# Patient Record
Sex: MALE | Race: WHITE | NOT HISPANIC OR LATINO | Employment: FULL TIME | URBAN - METROPOLITAN AREA
[De-identification: names, ages, dates, MRNs, and addresses within clinical notes are randomized per-mention and may not be internally consistent; named-entity substitution may affect disease eponyms.]

---

## 2017-01-13 ENCOUNTER — OFFICE VISIT (OUTPATIENT)
Dept: MEDICAL GROUP | Facility: MEDICAL CENTER | Age: 32
End: 2017-01-13
Payer: COMMERCIAL

## 2017-01-13 VITALS
TEMPERATURE: 98.1 F | WEIGHT: 210.32 LBS | RESPIRATION RATE: 16 BRPM | SYSTOLIC BLOOD PRESSURE: 120 MMHG | HEIGHT: 72 IN | HEART RATE: 74 BPM | OXYGEN SATURATION: 98 % | BODY MASS INDEX: 28.49 KG/M2 | DIASTOLIC BLOOD PRESSURE: 84 MMHG

## 2017-01-13 DIAGNOSIS — Z00.00 HEALTH MAINTENANCE EXAMINATION: ICD-10-CM

## 2017-01-13 DIAGNOSIS — D17.0 LIPOMA OF NECK: ICD-10-CM

## 2017-01-13 DIAGNOSIS — Z82.49 FAMILY HISTORY OF CARDIOVASCULAR DISEASE: ICD-10-CM

## 2017-01-13 PROCEDURE — 99385 PREV VISIT NEW AGE 18-39: CPT | Performed by: FAMILY MEDICINE

## 2017-01-13 ASSESSMENT — PATIENT HEALTH QUESTIONNAIRE - PHQ9: CLINICAL INTERPRETATION OF PHQ2 SCORE: 0

## 2017-01-13 NOTE — MR AVS SNAPSHOT
"        Ant Moya   2017 2:00 PM   Office Visit   MRN: [id number]    Department:  [place] [address]   Dept Phone:  [phone]    Description:  Male : 1985   Provider:  Yancy Siu M.D.           Reason for Visit     Hyperlipidemia recent labs showed elevated cholesterol    Establish Care new patient, establishing care      Allergies as of 2017     No Known Allergies      You were diagnosed with     Family history of cardiovascular disease   [127361]       Health maintenance examination   [704346]       Lipoma of neck   [767773]         Vital Signs     Blood Pressure Pulse Temperature Respirations Height Weight    120/84 mmHg 74 36.7 °C (98.1 °F) 16 1.829 m (6' 0.01\") 95.4 kg (210 lb 5.1 oz)    Body Mass Index Oxygen Saturation Smoking Status             28.52 kg/m2 98% Never Smoker          Basic Information     Date Of Birth Sex Race Ethnicity Preferred Language    1985 Male White Non- English      Your appointments     2017  2:20 PM   Established Patient with Yancy Siu M.D.   [facility]    [address]  [address]  [place] [id number] [phone]           You will be receiving a confirmation call a few days before your appointment from our automated call confirmation system.              Problem List              ICD-10-CM Priority Class Noted - Resolved    Active medical problems: none [id number]   2013 - Present    Family history of cardiovascular disease Z82.49   2017 - Present    Lipoma of neck D17.0   2017 - Present      Health Maintenance        Date Due Completion Dates    IMM DTaP/Tdap/Td Vaccine (1 - Tdap) 2003 ---            Current Immunizations     Influenza TIV (IM) 2013    Influenza Vaccine Quad Inj (Pf) 2016  6:44 PM, 2014    Influenza Vaccine Quad Inj (Preserved) 10/29/2015    Tuberculin Skin Test 2014  1:40 PM, 2013 " 12:40 PM, 12/11/2013  9:35 AM      Below and/or attached are the medications your provider expects you to take. Review all of your home medications and newly ordered medications with your provider and/or pharmacist. Follow medication instructions as directed by your provider and/or pharmacist. Please keep your medication list with you and share with your provider. Update the information when medications are discontinued, doses are changed, or new medications (including over-the-counter products) are added; and carry medication information at all times in the event of emergency situations     Allergies:  No Known Allergies          Medications  Valid as of: January 13, 2017 -  2:56 PM    Generic Name Brand Name Tablet Size Instructions for use    .                 Medicines prescribed today were sent to:     [facility] [address] [place] [address] [place], [place] - [address] AT [facility]    [address] [place] [address]    Phone: [phone] Fax: [phone]    Open 24 Hours?: No      Medication refill instructions:       If your prescription bottle indicates you have medication refills left, it is not necessary to call your provider’s office. Please contact your pharmacy and they will refill your medication.    If your prescription bottle indicates you do not have any refills left, you may request refills at any time through one of the following ways: The online [employer] system (except Urgent Care), by calling your provider’s office, or by asking your pharmacy to contact your provider’s office with a refill request. Medication refills are processed only during regular business hours and may not be available until the next business day. Your provider may request additional information or to have a follow-up visit with you prior to refilling your medication.   *Please Note: Medication refills are assigned a new Rx number when refilled electronically. Your pharmacy may indicate that no refills were  authorized even though a new prescription for the same medication is available at the pharmacy. Please request the medicine by name with the pharmacy before contacting your provider for a refill.        Your To Do List     Future Labs/Procedures Complete By Expires    CBC WITH DIFFERENTIAL  As directed 1/14/2018    COMP METABOLIC PANEL  As directed 1/14/2018    LIPID PROFILE  As directed 1/14/2018    TSH WITH REFLEX TO FT4  As directed 1/13/2018    US-SOFT TISSUES OF HEAD - NECK  As directed 7/16/2017    VITAMIN D,25 HYDROXY  As directed 1/14/2018      Referral     A referral request has been sent to our patient care coordination department. Please allow 3-5 business days for us to process this request and contact you either by phone or mail. If you do not hear from us by the 5th business day, please call us at [phone].           [employer] Access Code: Activation code not generated  Current [employer] Status: Active

## 2017-01-14 NOTE — PROGRESS NOTES
Ant Moya is a 31 y.o. male here for   Chief Complaint   Patient presents with   • Hyperlipidemia     recent labs showed elevated cholesterol   • Establish Care     new patient, establishing care     HPI: Ant is a very pleasant 31-year-old male. He is here today with his wife actually. 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    1. Health maintenance examination  Flu shot up-to-date  Patient sure that he has received a recent tetanus  Exercise: Minimal  Diet: Increased amount of red meat over the last year    2. Family history of cardiovascular disease  Patient's father, paternal uncle, paternal grandfather all had MIs around age 50. His dad is very healthy and active. He was very proactive about his health care and had a massive heart attack at age 54. His grandfather and uncle had heart attacks in their 40s. He is concerned about his health as he gets older. He is interested in seeing a cardiologist and having a complete cardiac workup. He is not exercising regularly currently.    3. Lipoma of neck  This is chronic and has been there for about 3 years. He has never had this evaluated. He does get headaches and believes that it is related to this. He denies any numbness or tingling down the arms. It is growing.        Current medicines (including changes today)  No current outpatient prescriptions on file.     No current facility-administered medications for this visit.     He  has no past medical history on file.  He  has no past surgical history on file.  Social History   Substance Use Topics   • Smoking status: Never Smoker    • Smokeless tobacco: Never Used   • Alcohol Use: Yes      Comment: rare     Social History     Social History Narrative     Family History   Problem Relation Age of Onset   • No Known Problems Mother    • Heart Disease Father 56     MI   • Arrythmia Father    • Heart Disease Paternal Uncle 56     MI   • Stroke Maternal Grandmother      cerebral aneurysm   •  "Stroke Paternal Grandfather    • Heart Disease Paternal Grandfather 44     MI     Family Status   Relation Status Death Age   • Mother Alive    • Father Alive          ROS  Positive for skin changes on the leg  All other systems reviewed and are negative     Objective:     Blood pressure 120/84, pulse 74, temperature 36.7 °C (98.1 °F), resp. rate 16, height 1.829 m (6' 0.01\"), weight 95.4 kg (210 lb 5.1 oz), SpO2 98 %. Body mass index is 28.52 kg/(m^2).  Physical Exam:    Constitutional: Alert, no distress.  Skin: Warm, dry, good turgor, no rashes in visible areas. 5 x 5 cm mass on the posterior neck. 0.4 x 0.4 mm dark nodule on the right arm, skin color no nodule on the left leg just inferior to the buttock  Eye: Equal, round and reactive, conjunctiva clear, lids normal.  ENMT: Lips without lesions, good dentition, oropharynx clear.  Neck: Trachea midline, no masses, no thyromegaly. No cervical or supraclavicular lymphadenopathy.  Respiratory: Unlabored respiratory effort, lungs clear to auscultation, no wheezes, no ronchi.  Cardiovascular: Normal S1, S2, no murmur, no edema.  Abdomen: Soft, non-tender, no masses, no hepatosplenomegaly.  Psych: Alert and oriented x3, normal affect and mood.      Assessment and Plan:   The following treatment plan was discussed    1. Health maintenance examination  Vaccines up-to-date  Labs ordered  Importance of a regular exercise routine and diet discussed in detail  - CBC WITH DIFFERENTIAL; Future  - COMP METABOLIC PANEL; Future  - VITAMIN D,25 HYDROXY; Future  - TSH WITH REFLEX TO FT4; Future  - LIPID PROFILE; Future    2. Family history of cardiovascular disease  Chronic and stable. History reviewed and he does have a strong family history of early cardiovascular disease  Lipids reviewed, recheck fasting lipid profile and other labs  Importance of daily exercise and a plant-based diet discussed in detail  Discussed possibility of primary prevention with aspirin and statin later " on, he would like to see cardiology for further workup this referral has been placed  - REFERRAL TO CARDIOLOGY    3. Lipoma of neck  This is chronic and growing. I have ordered an ultrasound for further evaluation  - US-SOFT TISSUES OF HEAD - NECK; Future    Records requested.  Followup: Return for skin excision, Long.

## 2017-01-27 ENCOUNTER — HOSPITAL ENCOUNTER (OUTPATIENT)
Dept: RADIOLOGY | Facility: MEDICAL CENTER | Age: 32
End: 2017-01-27
Attending: FAMILY MEDICINE
Payer: COMMERCIAL

## 2017-01-27 ENCOUNTER — HOSPITAL ENCOUNTER (OUTPATIENT)
Dept: LAB | Facility: MEDICAL CENTER | Age: 32
End: 2017-01-27
Attending: FAMILY MEDICINE
Payer: COMMERCIAL

## 2017-01-27 DIAGNOSIS — D17.0 LIPOMA OF NECK: ICD-10-CM

## 2017-01-27 DIAGNOSIS — Z00.00 HEALTH MAINTENANCE EXAMINATION: ICD-10-CM

## 2017-01-27 LAB
25(OH)D3 SERPL-MCNC: 18 NG/ML (ref 30–100)
ALBUMIN SERPL BCP-MCNC: 5 G/DL (ref 3.2–4.9)
ALBUMIN/GLOB SERPL: 1.6 G/DL
ALP SERPL-CCNC: 43 U/L (ref 30–99)
ALT SERPL-CCNC: 24 U/L (ref 2–50)
ANION GAP SERPL CALC-SCNC: 9 MMOL/L (ref 0–11.9)
AST SERPL-CCNC: 18 U/L (ref 12–45)
BASOPHILS # BLD AUTO: 0.06 K/UL (ref 0–0.12)
BASOPHILS NFR BLD AUTO: 1.2 % (ref 0–1.8)
BILIRUB SERPL-MCNC: 1.3 MG/DL (ref 0.1–1.5)
BUN SERPL-MCNC: 15 MG/DL (ref 8–22)
CALCIUM SERPL-MCNC: 10.1 MG/DL (ref 8.5–10.5)
CHLORIDE SERPL-SCNC: 102 MMOL/L (ref 96–112)
CHOLEST SERPL-MCNC: 205 MG/DL (ref 100–199)
CO2 SERPL-SCNC: 26 MMOL/L (ref 20–33)
CREAT SERPL-MCNC: 0.95 MG/DL (ref 0.5–1.4)
EOSINOPHIL # BLD: 0.13 K/UL (ref 0–0.51)
EOSINOPHIL NFR BLD AUTO: 2.5 % (ref 0–6.9)
ERYTHROCYTE [DISTWIDTH] IN BLOOD BY AUTOMATED COUNT: 39.4 FL (ref 35.9–50)
GLOBULIN SER CALC-MCNC: 3.2 G/DL (ref 1.9–3.5)
GLUCOSE SERPL-MCNC: 78 MG/DL (ref 65–99)
HCT VFR BLD AUTO: 47.5 % (ref 42–52)
HDLC SERPL-MCNC: 45 MG/DL
HGB BLD-MCNC: 15.8 G/DL (ref 14–18)
IMM GRANULOCYTES # BLD AUTO: 0.02 K/UL (ref 0–0.11)
IMM GRANULOCYTES NFR BLD AUTO: 0.4 % (ref 0–0.9)
LDLC SERPL CALC-MCNC: 136 MG/DL
LYMPHOCYTES # BLD: 1.45 K/UL (ref 1–4.8)
LYMPHOCYTES NFR BLD AUTO: 27.8 % (ref 22–41)
MCH RBC QN AUTO: 28.3 PG (ref 27–33)
MCHC RBC AUTO-ENTMCNC: 33.3 G/DL (ref 33.7–35.3)
MCV RBC AUTO: 85.1 FL (ref 81.4–97.8)
MONOCYTES # BLD: 0.4 K/UL (ref 0–0.85)
MONOCYTES NFR BLD AUTO: 7.7 % (ref 0–13.4)
NEUTROPHILS # BLD: 3.15 K/UL (ref 1.82–7.42)
NEUTROPHILS NFR BLD AUTO: 60.4 % (ref 44–72)
NRBC # BLD AUTO: 0 K/UL
NRBC BLD-RTO: 0 /100 WBC
PLATELET # BLD AUTO: 372 K/UL (ref 164–446)
PMV BLD AUTO: 9.6 FL (ref 9–12.9)
POTASSIUM SERPL-SCNC: 4 MMOL/L (ref 3.6–5.5)
PROT SERPL-MCNC: 8.2 G/DL (ref 6–8.2)
RBC # BLD AUTO: 5.58 M/UL (ref 4.7–6.1)
SODIUM SERPL-SCNC: 137 MMOL/L (ref 135–145)
TRIGL SERPL-MCNC: 120 MG/DL (ref 0–149)
TSH SERPL DL<=0.005 MIU/L-ACNC: 1.49 UIU/ML (ref 0.3–3.7)
WBC # BLD AUTO: 5.2 K/UL (ref 4.8–10.8)

## 2017-01-27 PROCEDURE — 82306 VITAMIN D 25 HYDROXY: CPT

## 2017-01-27 PROCEDURE — 80061 LIPID PANEL: CPT

## 2017-01-27 PROCEDURE — 76536 US EXAM OF HEAD AND NECK: CPT

## 2017-01-27 PROCEDURE — 84443 ASSAY THYROID STIM HORMONE: CPT

## 2017-01-27 PROCEDURE — 85025 COMPLETE CBC W/AUTO DIFF WBC: CPT

## 2017-01-27 PROCEDURE — 80053 COMPREHEN METABOLIC PANEL: CPT

## 2017-01-27 PROCEDURE — 36415 COLL VENOUS BLD VENIPUNCTURE: CPT

## 2017-02-17 ENCOUNTER — HOSPITAL ENCOUNTER (OUTPATIENT)
Facility: MEDICAL CENTER | Age: 32
End: 2017-02-17
Attending: FAMILY MEDICINE
Payer: COMMERCIAL

## 2017-02-17 ENCOUNTER — OFFICE VISIT (OUTPATIENT)
Dept: MEDICAL GROUP | Facility: MEDICAL CENTER | Age: 32
End: 2017-02-17
Payer: COMMERCIAL

## 2017-02-17 VITALS
SYSTOLIC BLOOD PRESSURE: 110 MMHG | DIASTOLIC BLOOD PRESSURE: 86 MMHG | WEIGHT: 207.89 LBS | OXYGEN SATURATION: 95 % | BODY MASS INDEX: 28.16 KG/M2 | HEIGHT: 72 IN | TEMPERATURE: 98.5 F | RESPIRATION RATE: 16 BRPM | HEART RATE: 75 BPM

## 2017-02-17 DIAGNOSIS — D22.61 MELANOCYTIC NEVUS OF RIGHT UPPER EXTREMITY: ICD-10-CM

## 2017-02-17 DIAGNOSIS — D17.0 LIPOMA OF NECK: ICD-10-CM

## 2017-02-17 PROBLEM — D22.9 ATYPICAL MOLE: Status: ACTIVE | Noted: 2017-02-17

## 2017-02-17 PROBLEM — D22.60 MELANOCYTIC NEVUS OF UPPER EXTREMITY: Status: ACTIVE | Noted: 2017-02-17

## 2017-02-17 LAB
AMBIGUOUS DTTM AMBI4: NORMAL
PATHOLOGY CONSULT NOTE: NORMAL

## 2017-02-17 PROCEDURE — 88305 TISSUE EXAM BY PATHOLOGIST: CPT

## 2017-02-17 PROCEDURE — 99213 OFFICE O/P EST LOW 20 MIN: CPT | Mod: 25 | Performed by: FAMILY MEDICINE

## 2017-02-17 PROCEDURE — 11401 EXC TR-EXT B9+MARG 0.6-1 CM: CPT | Performed by: FAMILY MEDICINE

## 2017-02-17 NOTE — MR AVS SNAPSHOT
Ant Moya   2017 2:20 PM   Office Visit   MRN: [id number]    Department:  [place] [address]   Dept Phone:  [phone]    Description:  Male : 1985   Provider:  Yancy Siu M.D.           Reason for Visit     Nevus removal      Allergies as of 2017     No Known Allergies      You were diagnosed with     Melanocytic nevus of right upper extremity   [0386133]       Lipoma of neck   [500843]         Vital Signs     Blood Pressure Pulse Temperature Respirations Height Weight    110/86 mmHg 75 36.9 °C (98.5 °F) 16 1.829 m (6') 94.3 kg (207 lb 14.3 oz)    Body Mass Index Oxygen Saturation Smoking Status             28.19 kg/m2 95% Never Smoker          Basic Information     Date Of Birth Sex Race Ethnicity Preferred Language    1985 Male White Non- English      Your appointments     Mar 17, 2017 11:15 AM   NEW PATIENT with Gurpreet Hutchison M.D.   [facility] (--)    [address] [facility] [address]  [facility] [address]   [phone]              Problem List              ICD-10-CM Priority Class Noted - Resolved    Family history of cardiovascular disease Z82.49   2017 - Present    Lipoma of neck D17.0   2017 - Present    Melanocytic nevus of upper extremity D22.60   2017 - Present    Hyperlipidemia E78.5   Unknown - Present      Health Maintenance        Date Due Completion Dates    IMM DTaP/Tdap/Td Vaccine (1 - Tdap) 2003 ---            Current Immunizations     Influenza TIV (IM) 2013    Influenza Vaccine Quad Inj (Pf) 2016  6:44 PM, 2014    Influenza Vaccine Quad Inj (Preserved) 10/29/2015    Tuberculin Skin Test 2014  1:40 PM, 2013 12:40 PM, 2013  9:35 AM      Below and/or attached are the medications your provider expects you to take. Review all of your home medications and newly ordered medications with your provider and/or pharmacist. Follow medication instructions  as directed by your provider and/or pharmacist. Please keep your medication list with you and share with your provider. Update the information when medications are discontinued, doses are changed, or new medications (including over-the-counter products) are added; and carry medication information at all times in the event of emergency situations     Allergies:  No Known Allergies          Medications  Valid as of: February 17, 2017 -  4:39 PM    Generic Name Brand Name Tablet Size Instructions for use    .                 Medicines prescribed today were sent to:     [facility] [address] [place] [address], [place] - [address] [facility] [address] [address] [place] [address]    Phone: [phone] Fax: [phone]    Open 24 Hours?: No      Medication refill instructions:       If your prescription bottle indicates you have medication refills left, it is not necessary to call your provider’s office. Please contact your pharmacy and they will refill your medication.    If your prescription bottle indicates you do not have any refills left, you may request refills at any time through one of the following ways: The online [email or web address] system (except Urgent Care), by calling your provider’s office, or by asking your pharmacy to contact your provider’s office with a refill request. Medication refills are processed only during regular business hours and may not be available until the next business day. Your provider may request additional information or to have a follow-up visit with you prior to refilling your medication.   *Please Note: Medication refills are assigned a new Rx number when refilled electronically. Your pharmacy may indicate that no refills were authorized even though a new prescription for the same medication is available at the pharmacy. Please request the medicine by name with the pharmacy before contacting your provider for a refill.        Referral     A referral request has been sent to  our patient care coordination department. Please allow 3-5 business days for us to process this request and contact you either by phone or mail. If you do not hear from us by the 5th business day, please call us at [phone].           [employer] Access Code: Activation code not generated  Current [employer] Status: Active

## 2017-02-17 NOTE — PROGRESS NOTES
Subjective:   Ant Moya is a 31 y.o. male here today for   Chief Complaint   Patient presents with   • Nevus     removal       1. Melanocytic nevus of right upper extremity  This is chronic and has been there for about 15 years. It is very dark in color. It has not changed. It does not itch or bleed. He would like it removed because of the concerning appearance.    2. Lipoma of neck  This is chronic. It has been there for many years. He is concerned because it is growing. He is wondering if this should be removed. He does not have any pain from this currently.    Ultrasound neck  1.  Subcutaneous mass at site of palpable abnormality is identified with characteristics consistent with lipoma. No other abnormalities are identified.    Current medicines (including changes today)  No current outpatient prescriptions on file.     No current facility-administered medications for this visit.     He  has no past medical history on file.    ROS  No neck pain, no numbness or tingling       Objective:     Blood pressure 110/86, pulse 75, temperature 36.9 °C (98.5 °F), resp. rate 16, height 1.829 m (6'), weight 94.3 kg (207 lb 14.3 oz), SpO2 95 %. Body mass index is 28.19 kg/(m^2).   Physical Exam:  Constitutional: Alert, no distress.  Skin: Warm, dry, good turgor, melanocytic 0.6 mm nevus on the right forearm  Eye: conjunctiva clear, lids normal.  ENMT: Lips without lesions, good dentition  Neck: Trachea midline, large 10 cm lipoma at the base of the neck  Respiratory: Unlabored respiratory effort  Cardiovascular: Regular rate  Psych: Alert and oriented x3, normal affect and mood.      Assessment and Plan:   The following treatment plan was discussed    1. Melanocytic nevus of right upper extremity    PROCEDURE NOTE, EXCISIONAL BIOPSY    Indication: Melanocytic nevus    Location of lesion to be excised: radial forearm    History of allergy to iodine: no     The risks (including bleeding and infection) and  benefits of the procedure and written informed consent obtained.     Local anesthesia was performed with  Lidocaine 1% with epinephrine  without added sodium bicarbonate (total 3cc), prepped with ChloraPrep, and draped in the usual sterile fashion.  A scalpel was used to excise an elliptical area of skin approximately 1cm by 1cm.  there was an area of black pigmentation outside of the nevus tract underneath the skin. This area was separately excised to remove all of the pigmentation. The wound was closed with 4-0 Vicryl using simple interrupted stitches and a sterile dressing applied.  The specimen was sent for pathologic examination.     The patient tolerated the procedure well and without apparent complications.     The patient was instructed to keep the wound dry and covered for 24-48h and clean thereafter, and warning signs of infection were reviewed.  Will return for suture removal in 7 days.  Recommended that the patient use OTC acetaminophen, OTC ibuprofen as needed for pain.  Followup sooner for any concerns.      2. Lipoma of neck  Chronic, stable  Because of the size and location of this, and concern for enlargement, will have him consult with a general surgeon  - REFERRAL TO GENERAL SURGERY      Followup: Return in about 7 days (around 2/24/2017), or if symptoms worsen or fail to improve, for suture removal.       This note was created using voice recognition software. I have made every reasonable attempt to correct errors, however, I do anticipate some grammatical errors.

## 2017-03-17 ENCOUNTER — OFFICE VISIT (OUTPATIENT)
Dept: CARDIOLOGY | Facility: MEDICAL CENTER | Age: 32
End: 2017-03-17
Payer: COMMERCIAL

## 2017-03-17 VITALS
SYSTOLIC BLOOD PRESSURE: 130 MMHG | HEIGHT: 72 IN | WEIGHT: 206.5 LBS | OXYGEN SATURATION: 98 % | BODY MASS INDEX: 27.97 KG/M2 | HEART RATE: 81 BPM | DIASTOLIC BLOOD PRESSURE: 88 MMHG

## 2017-03-17 DIAGNOSIS — Z82.49 FAMILY HISTORY OF HEART DISEASE: ICD-10-CM

## 2017-03-17 DIAGNOSIS — R00.2 PALPITATIONS: ICD-10-CM

## 2017-03-17 LAB — EKG IMPRESSION: NORMAL

## 2017-03-17 PROCEDURE — 99204 OFFICE O/P NEW MOD 45 MIN: CPT | Performed by: INTERNAL MEDICINE

## 2017-03-17 PROCEDURE — 93000 ELECTROCARDIOGRAM COMPLETE: CPT | Performed by: INTERNAL MEDICINE

## 2017-03-17 ASSESSMENT — ENCOUNTER SYMPTOMS
DOUBLE VISION: 0
ORTHOPNEA: 0
ABDOMINAL PAIN: 0
BLOOD IN STOOL: 0
SENSORY CHANGE: 0
MYALGIAS: 0
HALLUCINATIONS: 0
SPEECH CHANGE: 0
FEVER: 0
VOMITING: 0
NAUSEA: 0
PALPITATIONS: 0
DEPRESSION: 0
SHORTNESS OF BREATH: 0
LOSS OF CONSCIOUSNESS: 0
FALLS: 0
CHILLS: 0
BRUISES/BLEEDS EASILY: 0
EYE DISCHARGE: 0
PND: 0
CLAUDICATION: 0
EYE PAIN: 0
HEADACHES: 0
BLURRED VISION: 0
WEIGHT LOSS: 0
DIZZINESS: 0
COUGH: 0

## 2017-03-17 NOTE — PROGRESS NOTES
Subjective:   Ant Moya is a 31 y.o. male who presents today for cardiac care and evaluation in the heart clinic because of strong family history of heart disease. He has a lot of family members  of heart attack. His father is one of the few people that made it past 50 years old. Given that, he is quite concerned about his own risk. He does have history of fluctuating cholesterol issue. He is not on any medication for it though. No prior cardiac history or surgery. No invasive procedure either. He exercises every now and then without problems. No chest pain or shortness of breath. Occasional palpitations.    I have reviewed patient's ECG, which shows normal sinus rhythm, normal MD, QT intervals. No evidence of acute coronary syndrome.    Past Medical History   Diagnosis Date   • Hyperlipidemia      History reviewed. No pertinent past surgical history.  Family History   Problem Relation Age of Onset   • No Known Problems Mother    • Heart Disease Father 56     MI   • Arrythmia Father    • Heart Disease Paternal Uncle 56     MI   • Stroke Maternal Grandmother      cerebral aneurysm   • Stroke Paternal Grandfather    • Heart Disease Paternal Grandfather 44     MI     History   Smoking status   • Never Smoker    Smokeless tobacco   • Never Used     No Known Allergies  Outpatient Encounter Prescriptions as of 3/17/2017   Medication Sig Dispense Refill   • MULTIPLE VITAMIN PO Take 1 Tab by mouth every day.     • vitamin D (CHOLECALCIFEROL) 1000 UNIT Tab Take 1,000 Units by mouth 2 Times a Day.       No facility-administered encounter medications on file as of 3/17/2017.     Review of Systems   Constitutional: Negative for fever, chills, weight loss and malaise/fatigue.   HENT: Negative for ear discharge, ear pain, hearing loss and nosebleeds.    Eyes: Negative for blurred vision, double vision, pain and discharge.   Respiratory: Negative for cough and shortness of breath.    Cardiovascular: Negative for  "chest pain, palpitations, orthopnea, claudication, leg swelling and PND.   Gastrointestinal: Negative for nausea, vomiting, abdominal pain, blood in stool and melena.   Genitourinary: Negative for dysuria and hematuria.   Musculoskeletal: Negative for myalgias, joint pain and falls.   Skin: Negative for itching and rash.   Neurological: Negative for dizziness, sensory change, speech change, loss of consciousness and headaches.   Endo/Heme/Allergies: Negative for environmental allergies. Does not bruise/bleed easily.   Psychiatric/Behavioral: Negative for depression, suicidal ideas and hallucinations.        Objective:   /88 mmHg  Pulse 81  Ht 1.829 m (6' 0.01\")  Wt 93.668 kg (206 lb 8 oz)  BMI 28.00 kg/m2  SpO2 98%    Physical Exam   Constitutional: He is oriented to person, place, and time. He appears well-developed and well-nourished.   HENT:   Head: Normocephalic and atraumatic.   Eyes: EOM are normal.   Neck: Normal range of motion. No JVD present.   Cardiovascular: Normal rate, regular rhythm, normal heart sounds and intact distal pulses.  Exam reveals no gallop and no friction rub.    No murmur heard.  Bilateral femoral pulses are 2+, bilateral dorsalis pedis pulses are 2+, bilateral posterior tibialis pulses are 2+.   Pulmonary/Chest: No respiratory distress. He has no wheezes. He has no rales. He exhibits no tenderness.   Abdominal: Soft. Bowel sounds are normal. There is no tenderness. There is no rebound and no guarding.   The is no presence of abdominal bruits   Musculoskeletal: Normal range of motion.   Neurological: He is alert and oriented to person, place, and time.   Skin: Skin is warm and dry.   Psychiatric: He has a normal mood and affect.   Nursing note reviewed.      Assessment:     1. Palpitations  EKG    ECHOCARDIOGRAM COMP W/O CONT    TREADMILL STRESS    COMP METABOLIC PANEL    LIPID PANEL   2. Family history of heart disease  ECHOCARDIOGRAM COMP W/O CONT    TREADMILL STRESS    COMP " METABOLIC PANEL    LIPID PANEL       Medical Decision Making:  Today's Assessment / Status / Plan:     To further risk stratify, I will obtain a transthoracic echocardiogram along with a treadmill stress test.    Blood pressure is well controlled today. No medication.    I will see patient back in clinic with lab tests and studies results in 6 months.    I thank you Dr. Drake for referring patient to our Cardiology Clinic today.

## 2017-03-17 NOTE — Clinical Note
[facility]   [address], [address]  [place] [address]  Phone: [phone]  Fax: [phone]              Ant Moya  1985    Encounter Date: 3/17/2017    Gurpreet Hutchison M.D.          PROGRESS NOTE:  Subjective:   Ant Moya is a 31 y.o. male who presents today for cardiac care and evaluation in the heart clinic because of strong family history of heart disease. He has a lot of family members  of heart attack. His father is one of the few people that made it past 50 years old. Given that, he is quite concerned about his own risk. He does have history of fluctuating cholesterol issue. He is not on any medication for it though. No prior cardiac history or surgery. No invasive procedure either. He exercises every now and then without problems. No chest pain or shortness of breath. Occasional palpitations.    I have reviewed patient's ECG, which shows normal sinus rhythm, normal MN, QT intervals. No evidence of acute coronary syndrome.    Past Medical History   Diagnosis Date   • Hyperlipidemia      History reviewed. No pertinent past surgical history.  Family History   Problem Relation Age of Onset   • No Known Problems Mother    • Heart Disease Father 56     MI   • Arrythmia Father    • Heart Disease Paternal Uncle 56     MI   • Stroke Maternal Grandmother      cerebral aneurysm   • Stroke Paternal Grandfather    • Heart Disease Paternal Grandfather 44     MI     History   Smoking status   • Never Smoker    Smokeless tobacco   • Never Used     No Known Allergies  Outpatient Encounter Prescriptions as of 3/17/2017   Medication Sig Dispense Refill   • MULTIPLE VITAMIN PO Take 1 Tab by mouth every day.     • vitamin D (CHOLECALCIFEROL) 1000 UNIT Tab Take 1,000 Units by mouth 2 Times a Day.       No facility-administered encounter medications on file as of 3/17/2017.     Review of Systems   Constitutional: Negative for fever,  "chills, weight loss and malaise/fatigue.   HENT: Negative for ear discharge, ear pain, hearing loss and nosebleeds.    Eyes: Negative for blurred vision, double vision, pain and discharge.   Respiratory: Negative for cough and shortness of breath.    Cardiovascular: Negative for chest pain, palpitations, orthopnea, claudication, leg swelling and PND.   Gastrointestinal: Negative for nausea, vomiting, abdominal pain, blood in stool and melena.   Genitourinary: Negative for dysuria and hematuria.   Musculoskeletal: Negative for myalgias, joint pain and falls.   Skin: Negative for itching and rash.   Neurological: Negative for dizziness, sensory change, speech change, loss of consciousness and headaches.   Endo/Heme/Allergies: Negative for environmental allergies. Does not bruise/bleed easily.   Psychiatric/Behavioral: Negative for depression, suicidal ideas and hallucinations.        Objective:   /88 mmHg  Pulse 81  Ht 1.829 m (6' 0.01\")  Wt 93.668 kg (206 lb 8 oz)  BMI 28.00 kg/m2  SpO2 98%    Physical Exam   Constitutional: He is oriented to person, place, and time. He appears well-developed and well-nourished.   HENT:   Head: Normocephalic and atraumatic.   Eyes: EOM are normal.   Neck: Normal range of motion. No JVD present.   Cardiovascular: Normal rate, regular rhythm, normal heart sounds and intact distal pulses.  Exam reveals no gallop and no friction rub.    No murmur heard.  Bilateral femoral pulses are 2+, bilateral dorsalis pedis pulses are 2+, bilateral posterior tibialis pulses are 2+.   Pulmonary/Chest: No respiratory distress. He has no wheezes. He has no rales. He exhibits no tenderness.   Abdominal: Soft. Bowel sounds are normal. There is no tenderness. There is no rebound and no guarding.   The is no presence of abdominal bruits   Musculoskeletal: Normal range of motion.   Neurological: He is alert and oriented to person, place, and time.   Skin: Skin is warm and dry.   Psychiatric: He " has a normal mood and affect.   Nursing note reviewed.      Assessment:     1. Palpitations  EKG    ECHOCARDIOGRAM COMP W/O CONT    TREADMILL STRESS    COMP METABOLIC PANEL    LIPID PANEL   2. Family history of heart disease  ECHOCARDIOGRAM COMP W/O CONT    TREADMILL STRESS    COMP METABOLIC PANEL    LIPID PANEL       Medical Decision Making:  Today's Assessment / Status / Plan:     To further risk stratify, I will obtain a transthoracic echocardiogram along with a treadmill stress test.    Blood pressure is well controlled today. No medication.    I will see patient back in clinic with lab tests and studies results in 6 months.    I thank you Dr. Drake for referring patient to our Cardiology Clinic today.        Yancy Siu M.D.  [address] [address]  VIA In Basket

## 2017-04-12 ENCOUNTER — TELEPHONE (OUTPATIENT)
Dept: CARDIOLOGY | Facility: MEDICAL CENTER | Age: 32
End: 2017-04-12

## 2017-04-12 ENCOUNTER — NON-PROVIDER VISIT (OUTPATIENT)
Dept: CARDIOLOGY | Facility: MEDICAL CENTER | Age: 32
End: 2017-04-12
Attending: INTERNAL MEDICINE
Payer: COMMERCIAL

## 2017-04-12 DIAGNOSIS — R00.2 PALPITATIONS: ICD-10-CM

## 2017-04-12 DIAGNOSIS — Z82.49 FAMILY HISTORY OF HEART DISEASE: ICD-10-CM

## 2017-04-12 LAB
LV EJECT FRACT  99904: 55
LV EJECT FRACT MOD 2C 99903: 52.17
LV EJECT FRACT MOD 4C 99902: 60.17
LV EJECT FRACT MOD BP 99901: 55.66
TREADMILL STRESS: NORMAL

## 2017-04-12 PROCEDURE — 93015 CV STRESS TEST SUPVJ I&R: CPT | Performed by: INTERNAL MEDICINE

## 2017-04-12 PROCEDURE — 93306 TTE W/DOPPLER COMPLETE: CPT

## 2017-04-12 PROCEDURE — 93306 TTE W/DOPPLER COMPLETE: CPT | Mod: 26 | Performed by: INTERNAL MEDICINE

## 2017-04-12 NOTE — PROGRESS NOTES
Quick Note:    Dear Gladis,    Can you please let Ant Moya know that result is ok and I will see patient as scheduled?    Thanks Napoleon Griffith.    ______

## 2017-04-12 NOTE — TELEPHONE ENCOUNTER
Pt notified echo & treadmill from today are both WNL.  To follow up as planned in September. (recall)

## 2017-04-12 NOTE — TELEPHONE ENCOUNTER
----- Message from Gurpreet Hutchison M.D. sent at 4/12/2017 11:24 AM PDT -----  Dear Gladis,    Can you please let Ant Moya know that result is ok and I will see patient as scheduled?    Thanks Napoleon Griffith.

## 2017-04-18 ENCOUNTER — TELEPHONE (OUTPATIENT)
Dept: CARDIOLOGY | Facility: MEDICAL CENTER | Age: 32
End: 2017-04-18

## 2017-04-19 NOTE — TELEPHONE ENCOUNTER
TREADMILL STRESS   Status: Edited Result - FINAL     Visible to patient:  Janet     Dx:  Palpitations; Family history of heart...               Notes Recorded by Gurpreet Hutchison M.D. on 4/12/2017 at 3:39 PM  Dear Gladis,    Can you please let Ant Moya know that result is ok and I will see patient as scheduled?    Thanks Napoleon Griffith.    Notes Recorded by Gladis Gates, L.P.NNelson on 4/12/2017 at 10:30 AM  Echo & TM same day.  No FU to Dr. Hutchison     4/17/17:  Pt notified his echo and treadmill are both non-concerning.  To follow up as planned in September with Dr. Carli Gates

## 2017-04-26 RX ORDER — IBUPROFEN 200 MG
400-800 TABLET ORAL EVERY 6 HOURS PRN
COMMUNITY
End: 2019-02-27

## 2017-05-04 ENCOUNTER — HOSPITAL ENCOUNTER (OUTPATIENT)
Facility: MEDICAL CENTER | Age: 32
End: 2017-05-04
Attending: SURGERY | Admitting: SURGERY
Payer: COMMERCIAL

## 2017-05-04 VITALS
RESPIRATION RATE: 15 BRPM | BODY MASS INDEX: 28.34 KG/M2 | WEIGHT: 209.22 LBS | SYSTOLIC BLOOD PRESSURE: 115 MMHG | DIASTOLIC BLOOD PRESSURE: 68 MMHG | HEIGHT: 72 IN | HEART RATE: 71 BPM | OXYGEN SATURATION: 98 % | TEMPERATURE: 98.6 F

## 2017-05-04 PROBLEM — R22.9 SUBCUTANEOUS MASS: Status: ACTIVE | Noted: 2017-05-04

## 2017-05-04 PROBLEM — R22.9 SUBCUTANEOUS MASS: Status: RESOLVED | Noted: 2017-05-04 | Resolved: 2017-05-04

## 2017-05-04 PROCEDURE — 160009 HCHG ANES TIME/MIN: Performed by: SURGERY

## 2017-05-04 PROCEDURE — 160025 RECOVERY II MINUTES (STATS): Performed by: SURGERY

## 2017-05-04 PROCEDURE — 501445 HCHG STAPLER, SKIN DISP: Performed by: SURGERY

## 2017-05-04 PROCEDURE — 502240 HCHG MISC OR SUPPLY RC 0272: Performed by: SURGERY

## 2017-05-04 PROCEDURE — 160002 HCHG RECOVERY MINUTES (STAT): Performed by: SURGERY

## 2017-05-04 PROCEDURE — 160028 HCHG SURGERY MINUTES - 1ST 30 MINS LEVEL 3: Performed by: SURGERY

## 2017-05-04 PROCEDURE — 160047 HCHG PACU  - EA ADDL 30 MINS PHASE II: Performed by: SURGERY

## 2017-05-04 PROCEDURE — 160039 HCHG SURGERY MINUTES - EA ADDL 1 MIN LEVEL 3: Performed by: SURGERY

## 2017-05-04 PROCEDURE — 88304 TISSUE EXAM BY PATHOLOGIST: CPT

## 2017-05-04 PROCEDURE — 700111 HCHG RX REV CODE 636 W/ 250 OVERRIDE (IP)

## 2017-05-04 PROCEDURE — 160048 HCHG OR STATISTICAL LEVEL 1-5: Performed by: SURGERY

## 2017-05-04 PROCEDURE — 160046 HCHG PACU - 1ST 60 MINS PHASE II: Performed by: SURGERY

## 2017-05-04 PROCEDURE — 700101 HCHG RX REV CODE 250

## 2017-05-04 PROCEDURE — 160035 HCHG PACU - 1ST 60 MINS PHASE I: Performed by: SURGERY

## 2017-05-04 PROCEDURE — 501838 HCHG SUTURE GENERAL: Performed by: SURGERY

## 2017-05-04 RX ORDER — SODIUM CHLORIDE, SODIUM LACTATE, POTASSIUM CHLORIDE, CALCIUM CHLORIDE 600; 310; 30; 20 MG/100ML; MG/100ML; MG/100ML; MG/100ML
1000 INJECTION, SOLUTION INTRAVENOUS
Status: COMPLETED | OUTPATIENT
Start: 2017-05-04 | End: 2017-05-04

## 2017-05-04 RX ORDER — KETOROLAC TROMETHAMINE 30 MG/ML
INJECTION, SOLUTION INTRAMUSCULAR; INTRAVENOUS
Status: COMPLETED
Start: 2017-05-04 | End: 2017-05-04

## 2017-05-04 RX ORDER — HYDROCODONE BITARTRATE AND ACETAMINOPHEN 5; 325 MG/1; MG/1
1-2 TABLET ORAL EVERY 4 HOURS PRN
Qty: 20 TAB | Refills: 0 | Status: SHIPPED | OUTPATIENT
Start: 2017-05-04 | End: 2019-02-27

## 2017-05-04 RX ORDER — MIDAZOLAM HYDROCHLORIDE 1 MG/ML
INJECTION INTRAMUSCULAR; INTRAVENOUS
Status: DISCONTINUED
Start: 2017-05-04 | End: 2017-05-04 | Stop reason: HOSPADM

## 2017-05-04 RX ORDER — LIDOCAINE HYDROCHLORIDE 10 MG/ML
INJECTION, SOLUTION INFILTRATION; PERINEURAL
Status: COMPLETED
Start: 2017-05-04 | End: 2017-05-04

## 2017-05-04 RX ORDER — BUPIVACAINE HYDROCHLORIDE AND EPINEPHRINE 2.5; 5 MG/ML; UG/ML
INJECTION, SOLUTION EPIDURAL; INFILTRATION; INTRACAUDAL; PERINEURAL
Status: DISCONTINUED | OUTPATIENT
Start: 2017-05-04 | End: 2017-05-04 | Stop reason: HOSPADM

## 2017-05-04 RX ORDER — ACETAMINOPHEN 325 MG/1
1000 TABLET ORAL EVERY 4 HOURS PRN
COMMUNITY
End: 2019-02-27

## 2017-05-04 RX ADMIN — LIDOCAINE HYDROCHLORIDE: 10 INJECTION, SOLUTION INFILTRATION; PERINEURAL at 07:00

## 2017-05-04 RX ADMIN — SODIUM CHLORIDE, SODIUM LACTATE, POTASSIUM CHLORIDE, CALCIUM CHLORIDE 1000 ML: 600; 310; 30; 20 INJECTION, SOLUTION INTRAVENOUS at 07:00

## 2017-05-04 RX ADMIN — KETOROLAC TROMETHAMINE 30 MG: 30 INJECTION, SOLUTION INTRAMUSCULAR at 08:55

## 2017-05-04 ASSESSMENT — PAIN SCALES - GENERAL
PAINLEVEL_OUTOF10: 0

## 2017-05-04 ASSESSMENT — PAIN SCALES - WONG BAKER
WONGBAKER_NUMERICALRESPONSE: DOESN'T HURT AT ALL
WONGBAKER_NUMERICALRESPONSE: DOESN'T HURT AT ALL

## 2017-05-04 NOTE — IP AVS SNAPSHOT
5/4/2017    Ant Moya  [address] [place] [address]    Dear Ant:    [facility] wants to ensure your discharge home is safe and you or your loved ones have had all of your questions answered regarding your care after you leave the hospital.    Below is a list of resources and contact information should you have any questions regarding your hospital stay, follow-up instructions, or active medical symptoms.    Questions or Concerns Regarding… Contact   Medical Questions Related to Your Discharge  (7 days a week, 8am-5pm) Contact a Nurse Care Coordinator   [phone]   Medical Questions Not Related to Your Discharge  (24 hours a day / 7 days a week)  Contact the Nurse Health Line   [phone]    Medications or Discharge Instructions Refer to your discharge packet   or contact your [facility] Primary Care Provider   [phone]   Follow-up Appointment(s) Schedule your appointment via [employer]   or contact Scheduling [phone]   Billing Review your statement via [employer]  or contact Billing [phone]   Medical Records Review your records via [employer]   or contact Medical Records [phone]     You may receive a telephone call within two days of discharge. This call is to make certain you understand your discharge instructions and have the opportunity to have any questions answered. You can also easily access your medical information, test results and upcoming appointments via the [employer] free online health management tool. You can learn more and sign up at [employer]. For assistance setting up your [employer] account, please call [phone].    Once again, we want to ensure your discharge home is safe and that you have a clear understanding of any next steps in your care. If you have any questions or concerns, please do not hesitate to contact us, we are here for you. Thank you for choosing [facility] for your healthcare needs.    Sincerely,    Your [facility] Healthcare Team

## 2017-05-04 NOTE — PROGRESS NOTES
The Medication Reconciliation process has been completed by interviewing the patient    Allergies have been reviewed  Antibiotic use in 30 days - NONE    Home Pharmacy:  [facility]

## 2017-05-04 NOTE — OP REPORT
Operative Report     Patient:   Ant Moya  :    1985  MRN:    [id number]      Date:    2017    Surgeon:   Cameron Garner M.D.     Assistant:    None    Pre-operative Diagnosis:  5cm soft tissue mass of the posterior neck    Post-operative Diagnosis: same     Procedure:   Excision of soft tissue mass    Anesthesia:   General plus local 0.25% Marcaine with epinephrine    Blood Loss:   Minimal    Specimen:   Soft tissue mass to pathology for permanent    Findings:   5cm soft tissue mass consistent with lipoma    Wound classification: Clean    Disposition:   PACU in stable condition      Procedure in detail:  The patient was identified in the pre-operative holding area and brought to the operating room. Pre-operative antibiotics were administered prior to the procedure. General anesthesia was smoothly induced. The patient was positioned in a left lateral decubitus position and then prepped and draped in the usual sterile fashion. A surgical time out was called to identify the correct patient, procedure, and equipment.  Everyone was in agreement.    After infiltration of local anesthetic, an incision was made over the mass.  The subcutaneous tissue was dissected free from the margins of the mass in all directions using cautery and sharp dissection.  Once the mass was removed, the cavity was copiously irrigated and hemostasis was achieved with cautery.  The deeper tissues were reapproximated with multiple layers of interrupted absorbable sutures.  The skin was reapproximated with a running subcuticular suture.    Skin glue was applied.    Sponge and needle counts were correct at the end of the case.     The patient was awakened from general anesthetic, and was taken to the recovery room in stable condition.     Cameron Garner MD  General and Vascular Surgery  [facility]  Office [phone]

## 2017-05-04 NOTE — IP AVS SNAPSHOT
Home Care Instructions                                                                                                                Name:Ant Moya  Medical Record Number:[id number]  CSN: [id number]    YOB: 1985   Age: 31 y.o.  Sex: male  HT:1.829 m (6') WT: 94.9 kg (209 lb 3.5 oz)          Admit Date: 5/4/2017     Discharge Date:   Today's Date: 5/4/2017  Attending Doctor:  Cameron Garner M.D.                  Allergies:  Review of patient's allergies indicates no known allergies.                Discharge Instructions         ACTIVITY: Rest and take it easy for the first 24 hours.  A responsible adult is recommended to remain with you during that time.  It is normal to feel sleepy.  We encourage you to not do anything that requires balance, judgment or coordination.    MILD FLU-LIKE SYMPTOMS ARE NORMAL. YOU MAY EXPERIENCE GENERALIZED MUSCLE ACHES, THROAT IRRITATION, HEADACHE AND/OR SOME NAUSEA.    FOR 24 HOURS DO NOT:  Drive, operate machinery or run household appliances.  Drink beer or alcoholic beverages.   Make important decisions or sign legal documents.    SPECIAL INSTRUCTIONS:   Discharge Instructions     Procedure: Soft Tissue Mass Excision     1. ACTIVITIES: Upon discharge from the hospital, the day of surgery it is requested that you do no significant physical activity and no driving as you have had sedation. The day after surgery, you may resume activities of daily living, but for three weeks, no strenuous activities or heavy lifting (greater than 15 pounds).     2. DRIVING: You may drive whenever you are off pain medications and are able to perform the activities needed to drive, i.e. turning, bending, twisting, etc.     3. WOUND: It is not unusual for patients to experience swelling and even bruising, as well as a small amount of drainage from the incision. This is normal and will resolve over the next few days.     4. ICE: please use ice on the wound to decrease the  swelling for the first 24 hours and then discontinue. Apply ice for 20 minutes and then remove for 20 minutes, alternating while awake.     5. BATHING: Incision is covered with skin glue.  You do not need to cover it. You can shower starting 1 day after the operation.   Avoid submerging the incision in water (tub, bath, pool) for at least a week.     6. PAIN MEDICATION: You will be given a prescription for pain medication at discharge. Please take these as directed. It is important to remember not to take medications on an empty stomach as this may cause nausea.     7. BOWEL FUNCTION: After surgery, it is not uncommon for patients to experience constipation. This is due to decreasing activity levels as well as pain medications. You may wish to use a stool softener beginning immediately after surgery, and you may or may not need to use a laxative (Milk of Magnesia, Ex-lax; Senokot, etc.) as well.     8 .CALL IF YOU HAVE: (1) Fevers to more than 101.5 F, (2) Unusual or excessive pain, (3) Drainage or fluid from incision that may be foul smelling, increased tenderness or soreness at the wound or the wound edges are no longer together, redness or swelling at the incision site. Please do not hesitate to call with any other questions.     9. APPOINTMENT: Contact our office at [phone] for a follow-up appointment 2 weeks following your procedure.     If you have any additional questions, please do not hesitate to call the office and speak to either myself or the physician on call.     Office addresses:   [address] [place] [address] [address]   [facility] [address]   [phone]     Cameron Garner MD   [facility]   [phone]    DIET: To avoid nausea, slowly advance diet as tolerated, avoiding spicy or greasy foods for the first day.  Add more substantial food to your diet according to your physician's instructions.  INCREASE FLUIDS AND FIBER TO AVOID CONSTIPATION.    FOLLOW-UP APPOINTMENT:  A follow-up appointment should  be arranged with your doctor in 1-2 weeks; call to schedule.    You should CALL YOUR PHYSICIAN if you develop:  Fever greater than 101 degrees F.  Pain not relieved by medication, or persistent nausea or vomiting.  Excessive bleeding (blood soaking through dressing) or unexpected drainage from the wound.  Extreme redness or swelling around the incision site, drainage of pus or foul smelling drainage.  Inability to urinate or empty your bladder within 8 hours.  Problems with breathing or chest pain.    You should call [phone] if you develop problems with breathing or chest pain.  If you are unable to contact your doctor or surgical center, you should go to the nearest emergency room or urgent care center.  Physician's telephone #: [phone]    If any questions arise, call your doctor.  If your doctor is not available, please feel free to call the Surgical Center at [phone].  The Center is open Monday through Friday from 7AM to 7PM.  You can also call the [employer] HOTLINE open 24 hours/day, 7 days/week and speak to a nurse at [phone], or toll free at [phone].    A registered nurse may call you a few days after your surgery to see how you are doing after your procedure.    MEDICATIONS: Resume taking daily medication.  Take prescribed pain medication with food.  If no medication is prescribed, you may take non-aspirin pain medication if needed.  PAIN MEDICATION CAN BE VERY CONSTIPATING.  Take a stool softener or laxative such as senokot, pericolace, or milk of magnesia if needed.    Prescription given for Norco.  No oral pain medication given, you may take a dose at anytime.    If your physician has prescribed pain medication that includes Acetaminophen (Tylenol), do not take additional Acetaminophen (Tylenol) while taking the prescribed medication.    Depression / Suicide Risk    As you are discharged from this [facility] facility, it is important to learn how to keep safe from harming  yourself.    Recognize the warning signs:  · Abrupt changes in personality, positive or negative- including increase in energy   · Giving away possessions  · Change in eating patterns- significant weight changes-  positive or negative  · Change in sleeping patterns- unable to sleep or sleeping all the time   · Unwillingness or inability to communicate  · Depression  · Unusual sadness, discouragement and loneliness  · Talk of wanting to die  · Neglect of personal appearance   · Rebelliousness- reckless behavior  · Withdrawal from people/activities they love  · Confusion- inability to concentrate     If you or a loved one observes any of these behaviors or has concerns about self-harm, here's what you can do:  · Talk about it- your feelings and reasons for harming yourself  · Remove any means that you might use to hurt yourself (examples: pills, rope, extension cords, firearm)  · Get professional help from the community (Mental Health, Substance Abuse, psychological counseling)  · Do not be alone:Call your Safe Contact- someone whom you trust who will be there for you.  · Call your local CRISIS HOTLINE 401-9933 or 761-101-4878  · Call your local Children's Mobile Crisis Response Team [place] (976) 071-5270 or www.[employer]  · Call the toll free National Suicide Prevention Hotlines   · National Suicide Prevention Lifeline 625-993-IKTH (5053)  · National Hope Line Network 800-SUICIDE (462-4680)       Medication List      START taking these medications        Instructions    Morning Afternoon Evening Bedtime    hydrocodone-acetaminophen 5-325 MG Tabs per tablet   Commonly known as:  NORCO        Doctor's comments:  No driving while on pain medications   Take 1-2 Tabs by mouth every four hours as needed (as needed for pain).   Dose:  1-2 Tab                          CONTINUE taking these medications        Instructions    Morning Afternoon Evening Bedtime    acetaminophen 325 MG Tabs   Commonly known as:   TYLENOL        Take 1,000 mg by mouth every four hours as needed.   Dose:  1000 mg                        ibuprofen 200 MG Tabs   Commonly known as:  MOTRIN        Take 400-800 mg by mouth every 6 hours as needed.   Dose:  400-800 mg                        MULTIPLE VITAMIN PO        Take 1 Tab by mouth every day.   Dose:  1 Tab                        vitamin D 1000 UNIT Tabs   Commonly known as:  cholecalciferol        Take 1,000 Units by mouth 2 Times a Day.   Dose:  1000 Units                             Where to Get Your Medications      You can get these medications from any pharmacy     Bring a paper prescription for each of these medications    - hydrocodone-acetaminophen 5-325 MG Tabs per tablet            Medication Information     Above and/or attached are the medications your physician expects you to take upon discharge. Review all of your home medications and newly ordered medications with your doctor and/or pharmacist. Follow medication instructions as directed by your doctor and/or pharmacist. Please keep your medication list with you and share with your physician. Update the information when medications are discontinued, doses are changed, or new medications (including over-the-counter products) are added; and carry medication information at all times in the event of emergency situations.        Resources     Quit Smoking / Tobacco Use:    I understand the use of any tobacco products increases my chance of suffering from future heart disease or stroke and could cause other illnesses which may shorten my life. Quitting the use of tobacco products is the single most important thing I can do to improve my health. For further information on smoking / tobacco cessation call a Toll Free Quit Line at 1-434.378.6101 (*National Cancer [place]) or 1-983.529.6360 (American Lung Association) or you can access the web based program at www.lungusa.org.    [place] Tobacco Users Help Line:  (955) 454-1379       Toll  Free: 3-730-294-2350  Quit Tobacco Program [facility] (142)810-5682    Crisis Hotline:    National Crisis Hotline:  0-368-KMEOTPJ or 1-389.678.6196    [place] Crisis Hotline:    1-456.706.6521 or 173-495-3061    Discharge Survey:   Thank you for choosing [facility]. We hope we did everything we could to make your hospital stay a pleasant one. You may be receiving a survey and we would appreciate your time and participation in answering the questions. Your input is very valuable to us in our efforts to improve our service to our patients and their families.            Signatures     My signature on this form indicates that:    1. I acknowledge receipt and understanding of these Home Care Instruction.  2. My questions regarding this information have been answered to my satisfaction.  3. I have formulated a plan with my discharge nurse to obtain my prescribed medications for home.    __________________________________      __________________________________                   Patient Signature                                 Guardian/Responsible Adult Signature      __________________________________                 __________       ________                       Nurse Signature                                               Date                 Time

## 2017-05-04 NOTE — DISCHARGE INSTRUCTIONS
ACTIVITY: Rest and take it easy for the first 24 hours.  A responsible adult is recommended to remain with you during that time.  It is normal to feel sleepy.  We encourage you to not do anything that requires balance, judgment or coordination.    MILD FLU-LIKE SYMPTOMS ARE NORMAL. YOU MAY EXPERIENCE GENERALIZED MUSCLE ACHES, THROAT IRRITATION, HEADACHE AND/OR SOME NAUSEA.    FOR 24 HOURS DO NOT:  Drive, operate machinery or run household appliances.  Drink beer or alcoholic beverages.   Make important decisions or sign legal documents.    SPECIAL INSTRUCTIONS:   Discharge Instructions     Procedure: Soft Tissue Mass Excision     1. ACTIVITIES: Upon discharge from the hospital, the day of surgery it is requested that you do no significant physical activity and no driving as you have had sedation. The day after surgery, you may resume activities of daily living, but for three weeks, no strenuous activities or heavy lifting (greater than 15 pounds).     2. DRIVING: You may drive whenever you are off pain medications and are able to perform the activities needed to drive, i.e. turning, bending, twisting, etc.     3. WOUND: It is not unusual for patients to experience swelling and even bruising, as well as a small amount of drainage from the incision. This is normal and will resolve over the next few days.     4. ICE: please use ice on the wound to decrease the swelling for the first 24 hours and then discontinue. Apply ice for 20 minutes and then remove for 20 minutes, alternating while awake.     5. BATHING: Incision is covered with skin glue.  You do not need to cover it. You can shower starting 1 day after the operation.   Avoid submerging the incision in water (tub, bath, pool) for at least a week.     6. PAIN MEDICATION: You will be given a prescription for pain medication at discharge. Please take these as directed. It is important to remember not to take medications on an empty stomach as this may cause  nausea.     7. BOWEL FUNCTION: After surgery, it is not uncommon for patients to experience constipation. This is due to decreasing activity levels as well as pain medications. You may wish to use a stool softener beginning immediately after surgery, and you may or may not need to use a laxative (Milk of Magnesia, Ex-lax; Senokot, etc.) as well.     8 .CALL IF YOU HAVE: (1) Fevers to more than 101.5 F, (2) Unusual or excessive pain, (3) Drainage or fluid from incision that may be foul smelling, increased tenderness or soreness at the wound or the wound edges are no longer together, redness or swelling at the incision site. Please do not hesitate to call with any other questions.     9. APPOINTMENT: Contact our office at [phone] for a follow-up appointment 2 weeks following your procedure.     If you have any additional questions, please do not hesitate to call the office and speak to either myself or the physician on call.     Office addresses:   [address] [place] [id number]   [phone]     Cameron Garner MD   [facility]   [phone]    DIET: To avoid nausea, slowly advance diet as tolerated, avoiding spicy or greasy foods for the first day.  Add more substantial food to your diet according to your physician's instructions.  INCREASE FLUIDS AND FIBER TO AVOID CONSTIPATION.    FOLLOW-UP APPOINTMENT:  A follow-up appointment should be arranged with your doctor in 1-2 weeks; call to schedule.    You should CALL YOUR PHYSICIAN if you develop:  Fever greater than 101 degrees F.  Pain not relieved by medication, or persistent nausea or vomiting.  Excessive bleeding (blood soaking through dressing) or unexpected drainage from the wound.  Extreme redness or swelling around the incision site, drainage of pus or foul smelling drainage.  Inability to urinate or empty your bladder within 8 hours.  Problems with breathing or chest pain.    You should call [phone] if you develop problems with  breathing or chest pain.  If you are unable to contact your doctor or surgical center, you should go to the nearest emergency room or urgent care center.  Physician's telephone #: [phone]    If any questions arise, call your doctor.  If your doctor is not available, please feel free to call the Surgical Center at [phone].  The Center is open Monday through Friday from 7AM to 7PM.  You can also call the HEALTH HOTLINE open 24 hours/day, 7 days/week and speak to a nurse at [phone], or toll free at [phone].    A registered nurse may call you a few days after your surgery to see how you are doing after your procedure.    MEDICATIONS: Resume taking daily medication.  Take prescribed pain medication with food.  If no medication is prescribed, you may take non-aspirin pain medication if needed.  PAIN MEDICATION CAN BE VERY CONSTIPATING.  Take a stool softener or laxative such as senokot, pericolace, or milk of magnesia if needed.    Prescription given for Norco.  No oral pain medication given, you may take a dose at anytime.    If your physician has prescribed pain medication that includes Acetaminophen (Tylenol), do not take additional Acetaminophen (Tylenol) while taking the prescribed medication.    Depression / Suicide Risk    As you are discharged from this [facility] facility, it is important to learn how to keep safe from harming yourself.    Recognize the warning signs:  · Abrupt changes in personality, positive or negative- including increase in energy   · Giving away possessions  · Change in eating patterns- significant weight changes-  positive or negative  · Change in sleeping patterns- unable to sleep or sleeping all the time   · Unwillingness or inability to communicate  · Depression  · Unusual sadness, discouragement and loneliness  · Talk of wanting to die  · Neglect of personal appearance   · Rebelliousness- reckless behavior  · Withdrawal from people/activities they  love  · Confusion- inability to concentrate     If you or a loved one observes any of these behaviors or has concerns about self-harm, here's what you can do:  · Talk about it- your feelings and reasons for harming yourself  · Remove any means that you might use to hurt yourself (examples: pills, rope, extension cords, firearm)  · Get professional help from the community (Mental Health, Substance Abuse, psychological counseling)  · Do not be alone:Call your Safe Contact- someone whom you trust who will be there for you.  · Call your local CRISIS HOTLINE 660-9342 or 963-203-2707  · Call your local Children's Mobile Crisis Response Team [place] (871) 678-6128 or www.[employer]  · Call the toll free National Suicide Prevention Hotlines   · National Suicide Prevention Lifeline 319-896-JZSU (9916)  · National Hope Line Network 800-SUICIDE (212-6628)

## 2017-07-26 ENCOUNTER — HOSPITAL ENCOUNTER (OUTPATIENT)
Dept: LAB | Facility: MEDICAL CENTER | Age: 32
End: 2017-07-26
Payer: COMMERCIAL

## 2017-07-26 LAB
BDY FAT % MEASURED: 20.6 %
BP DIAS: 61 MMHG
BP SYS: 111 MMHG
CHOLEST SERPL-MCNC: 195 MG/DL (ref 100–199)
DIABETES HTDIA: NO
EVENT NAME HTEVT: NORMAL
FASTING HTFAS: YES
GLUCOSE SERPL-MCNC: 96 MG/DL (ref 65–99)
HDLC SERPL-MCNC: 42 MG/DL
HIP CIRCUMFERENCE HTHC: NORMAL IN
HYPERTENSION HTHYP: NO
LDLC SERPL CALC-MCNC: 117 MG/DL
SCREENING LOC CITY HTCIT: NORMAL
SCREENING LOC STATE HTSTA: NORMAL
SCREENING LOCATION HTLOC: NORMAL
SMOKING HTSMO: NO
SUBSCRIBER ID HTSID: NORMAL
TRIGL SERPL-MCNC: 180 MG/DL (ref 0–149)
WAIST CIRCUMFERENCE HTWC: NORMAL IN

## 2017-07-26 PROCEDURE — 36415 COLL VENOUS BLD VENIPUNCTURE: CPT

## 2017-07-26 PROCEDURE — 80061 LIPID PANEL: CPT

## 2017-07-26 PROCEDURE — 82947 ASSAY GLUCOSE BLOOD QUANT: CPT

## 2017-07-26 PROCEDURE — S5190 WELLNESS ASSESSMENT BY NONPH: HCPCS

## 2017-08-31 ENCOUNTER — TELEPHONE (OUTPATIENT)
Dept: MEDICAL GROUP | Facility: LAB | Age: 32
End: 2017-08-31

## 2017-08-31 DIAGNOSIS — Z82.49 FAMILY HISTORY OF CARDIOVASCULAR DISEASE: ICD-10-CM

## 2017-08-31 NOTE — TELEPHONE ENCOUNTER
Claudia from [facility] called stating patient's referral has  in July and will need a new referral to see Dr Hutchison. His Cardiology appointment on 2017.

## 2017-09-05 ENCOUNTER — EH NON-PROVIDER (OUTPATIENT)
Dept: OCCUPATIONAL MEDICINE | Facility: CLINIC | Age: 32
End: 2017-09-05

## 2017-09-05 DIAGNOSIS — Z29.89 NEED FOR ISOLATION: ICD-10-CM

## 2017-09-05 PROCEDURE — 94375 RESPIRATORY FLOW VOLUME LOOP: CPT

## 2017-09-19 ENCOUNTER — TELEPHONE (OUTPATIENT)
Dept: CARDIOLOGY | Facility: MEDICAL CENTER | Age: 32
End: 2017-09-19

## 2017-09-19 NOTE — TELEPHONE ENCOUNTER
Patient wants new lab order put into EPIC   Received: Today   Message Contents   Sylvie Dupont R.N.             TT/Marii     Patient lost his lab order and wants a new order put into EPIC.      Returned patient call. Informed new order not needed. Reminded of fasting status prior to lab draw.

## 2017-09-20 ENCOUNTER — HOSPITAL ENCOUNTER (OUTPATIENT)
Dept: LAB | Facility: MEDICAL CENTER | Age: 32
End: 2017-09-20
Attending: INTERNAL MEDICINE
Payer: COMMERCIAL

## 2017-09-20 DIAGNOSIS — R00.2 PALPITATIONS: ICD-10-CM

## 2017-09-20 DIAGNOSIS — Z82.49 FAMILY HISTORY OF HEART DISEASE: ICD-10-CM

## 2017-09-20 LAB
ALBUMIN SERPL BCP-MCNC: 4.5 G/DL (ref 3.2–4.9)
ALBUMIN/GLOB SERPL: 1.4 G/DL
ALP SERPL-CCNC: 54 U/L (ref 30–99)
ALT SERPL-CCNC: 29 U/L (ref 2–50)
ANION GAP SERPL CALC-SCNC: 9 MMOL/L (ref 0–11.9)
AST SERPL-CCNC: 20 U/L (ref 12–45)
BILIRUB SERPL-MCNC: 0.5 MG/DL (ref 0.1–1.5)
BUN SERPL-MCNC: 15 MG/DL (ref 8–22)
CALCIUM SERPL-MCNC: 9.6 MG/DL (ref 8.5–10.5)
CHLORIDE SERPL-SCNC: 103 MMOL/L (ref 96–112)
CHOLEST SERPL-MCNC: 215 MG/DL (ref 100–199)
CO2 SERPL-SCNC: 24 MMOL/L (ref 20–33)
CREAT SERPL-MCNC: 0.92 MG/DL (ref 0.5–1.4)
GFR SERPL CREATININE-BSD FRML MDRD: >60 ML/MIN/1.73 M 2
GLOBULIN SER CALC-MCNC: 3.2 G/DL (ref 1.9–3.5)
GLUCOSE SERPL-MCNC: 89 MG/DL (ref 65–99)
HDLC SERPL-MCNC: 45 MG/DL
LDLC SERPL CALC-MCNC: 143 MG/DL
POTASSIUM SERPL-SCNC: 4.2 MMOL/L (ref 3.6–5.5)
PROT SERPL-MCNC: 7.7 G/DL (ref 6–8.2)
SODIUM SERPL-SCNC: 136 MMOL/L (ref 135–145)
TRIGL SERPL-MCNC: 133 MG/DL (ref 0–149)

## 2017-09-20 PROCEDURE — 80061 LIPID PANEL: CPT

## 2017-09-20 PROCEDURE — 36415 COLL VENOUS BLD VENIPUNCTURE: CPT

## 2017-09-20 PROCEDURE — 80053 COMPREHEN METABOLIC PANEL: CPT

## 2017-09-21 ENCOUNTER — OFFICE VISIT (OUTPATIENT)
Dept: CARDIOLOGY | Facility: MEDICAL CENTER | Age: 32
End: 2017-09-21
Payer: COMMERCIAL

## 2017-09-21 VITALS
WEIGHT: 207 LBS | HEIGHT: 72 IN | OXYGEN SATURATION: 97 % | HEART RATE: 68 BPM | BODY MASS INDEX: 28.04 KG/M2 | SYSTOLIC BLOOD PRESSURE: 110 MMHG | DIASTOLIC BLOOD PRESSURE: 80 MMHG

## 2017-09-21 DIAGNOSIS — E78.5 OTHER AND UNSPECIFIED HYPERLIPIDEMIA: ICD-10-CM

## 2017-09-21 DIAGNOSIS — Z82.49 FAMILY HISTORY OF CARDIOVASCULAR DISEASE: ICD-10-CM

## 2017-09-21 PROCEDURE — 99214 OFFICE O/P EST MOD 30 MIN: CPT | Performed by: INTERNAL MEDICINE

## 2017-09-21 ASSESSMENT — ENCOUNTER SYMPTOMS
WEIGHT LOSS: 0
MYALGIAS: 0
EYE PAIN: 0
DIZZINESS: 0
CHILLS: 0
SENSORY CHANGE: 0
VOMITING: 0
BLURRED VISION: 0
CLAUDICATION: 0
PALPITATIONS: 0
PND: 0
COUGH: 0
SPEECH CHANGE: 0
DEPRESSION: 0
HEADACHES: 0
BRUISES/BLEEDS EASILY: 0
SHORTNESS OF BREATH: 0
HALLUCINATIONS: 0
ABDOMINAL PAIN: 0
FEVER: 0
LOSS OF CONSCIOUSNESS: 0
FALLS: 0
EYE DISCHARGE: 0
ORTHOPNEA: 0
NAUSEA: 0
BLOOD IN STOOL: 0
DOUBLE VISION: 0

## 2017-09-21 NOTE — PROGRESS NOTES
Subjective:   Ant Moya is a 32 y.o. male who presents today for cardiac care and evaluation in the heart clinic because of strong family history of heart disease. He has a lot of family members  of heart attack. His father is one of the few people that made it past 50 years old.     His cardiac workup with a transthoracic echocardiogram and treadmill stress test came back non-worrisome.     In the interim, patient has been doing well without having any symptoms. Patient denies having chest pain, dyspnea, palpitation, presyncope, syncope episodes. Able to climb up at least 2 flights of stairs.      Past Medical History:   Diagnosis Date   • Hyperlipidemia     diet controlled     Past Surgical History:   Procedure Laterality Date   • MASS EXCISION GENERAL  2017    Procedure: MASS EXCISION GENERAL SUBCUATNEOUS POSTERIOR NECK;  Surgeon: Cameron Garner M.D.;  Location: SURGERY [facility];  Service:      Family History   Problem Relation Age of Onset   • No Known Problems Mother    • Heart Disease Father 56     MI   • Arrythmia Father    • Heart Disease Paternal Uncle 56     MI   • Stroke Maternal Grandmother      cerebral aneurysm   • Stroke Paternal Grandfather    • Heart Disease Paternal Grandfather 44     MI     History   Smoking Status   • Former Smoker   • Years: 1.00   • Types: Cigars, Pipe   Smokeless Tobacco   • Never Used     No Known Allergies  Outpatient Encounter Prescriptions as of 2017   Medication Sig Dispense Refill   • MULTIPLE VITAMIN PO Take 1 Tab by mouth every day.     • vitamin D (CHOLECALCIFEROL) 1000 UNIT Tab Take 1,000 Units by mouth 2 Times a Day.     • acetaminophen (TYLENOL) 325 MG Tab Take 1,000 mg by mouth every four hours as needed.     • hydrocodone-acetaminophen (NORCO) 5-325 MG Tab per tablet Take 1-2 Tabs by mouth every four hours as needed (as needed for pain). 20 Tab 0   • ibuprofen (MOTRIN) 200 MG Tab Take 400-800 mg by mouth every 6 hours as  needed.       No facility-administered encounter medications on file as of 9/21/2017.      Review of Systems   Constitutional: Negative for chills, fever, malaise/fatigue and weight loss.   HENT: Negative for ear discharge, ear pain, hearing loss and nosebleeds.    Eyes: Negative for blurred vision, double vision, pain and discharge.   Respiratory: Negative for cough and shortness of breath.    Cardiovascular: Negative for chest pain, palpitations, orthopnea, claudication, leg swelling and PND.   Gastrointestinal: Negative for abdominal pain, blood in stool, melena, nausea and vomiting.   Genitourinary: Negative for dysuria and hematuria.   Musculoskeletal: Negative for falls, joint pain and myalgias.   Skin: Negative for itching and rash.   Neurological: Negative for dizziness, sensory change, speech change, loss of consciousness and headaches.   Endo/Heme/Allergies: Negative for environmental allergies. Does not bruise/bleed easily.   Psychiatric/Behavioral: Negative for depression, hallucinations and suicidal ideas.        Objective:   /80   Pulse 68   Ht 1.829 m (6')   Wt 93.9 kg (207 lb)   SpO2 97%   BMI 28.07 kg/m²     Physical Exam   Constitutional: He is oriented to person, place, and time. He appears well-developed and well-nourished.   HENT:   Head: Normocephalic and atraumatic.   Eyes: EOM are normal.   Neck: Normal range of motion. No JVD present.   Cardiovascular: Normal rate, regular rhythm, normal heart sounds and intact distal pulses.  Exam reveals no gallop and no friction rub.    No murmur heard.  Bilateral femoral pulses are 2+, bilateral dorsalis pedis pulses are 2+, bilateral posterior tibialis pulses are 2+.   Pulmonary/Chest: No respiratory distress. He has no wheezes. He has no rales. He exhibits no tenderness.   Abdominal: Soft. Bowel sounds are normal. There is no tenderness. There is no rebound and no guarding.   The is no presence of abdominal bruits   Musculoskeletal: Normal  range of motion.   Neurological: He is alert and oriented to person, place, and time.   Skin: Skin is warm and dry.   Psychiatric: He has a normal mood and affect.   Nursing note and vitals reviewed.      Assessment:     1. Other and unspecified hyperlipidemia  COMP METABOLIC PANEL    LIPID PANEL   2. Family history of cardiovascular disease  COMP METABOLIC PANEL    LIPID PANEL       Medical Decision Making:  Today's Assessment / Status / Plan:   At this time patient is clinically stable in terms of his cardiac standpoint.  Blood pressure is well controlled.    I will see patient back in clinic with lab tests and studies results in 12 months.    I thank you Dr. Siu for referring patient to our Cardiology Clinic today.

## 2017-09-21 NOTE — LETTER
[facility]   [address], [facility] [address] [facility] [address]  [place] [address]  Phone: [phone]  Fax: [phone]              Ant Moya  1985    Encounter Date: 2017    Gurpreet Hutchison M.D.          PROGRESS NOTE:  Subjective:   Ant Moya is a 32 y.o. male who presents today for cardiac care and evaluation in the heart clinic because of strong family history of heart disease. He has a lot of family members  of heart attack. His father is one of the few people that made it past 50 years old.     His cardiac workup with a transthoracic echocardiogram and treadmill stress test came back non-worrisome.     In the interim, patient has been doing well without having any symptoms. Patient denies having chest pain, dyspnea, palpitation, presyncope, syncope episodes. Able to climb up at least 2 flights of stairs.      Past Medical History:   Diagnosis Date   • Hyperlipidemia     diet controlled     Past Surgical History:   Procedure Laterality Date   • MASS EXCISION GENERAL  2017    Procedure: MASS EXCISION GENERAL SUBCUATNEOUS POSTERIOR NECK;  Surgeon: Cameron Garner M.D.;  Location: SURGERY [facility];  Service:      Family History   Problem Relation Age of Onset   • No Known Problems Mother    • Heart Disease Father 56     MI   • Arrythmia Father    • Heart Disease Paternal Uncle 56     MI   • Stroke Maternal Grandmother      cerebral aneurysm   • Stroke Paternal Grandfather    • Heart Disease Paternal Grandfather 44     MI     History   Smoking Status   • Former Smoker   • Years: 1.00   • Types: Cigars, Pipe   Smokeless Tobacco   • Never Used     No Known Allergies  Outpatient Encounter Prescriptions as of 2017   Medication Sig Dispense Refill   • MULTIPLE VITAMIN PO Take 1 Tab by mouth every day.     • vitamin D (CHOLECALCIFEROL) 1000 UNIT Tab Take 1,000 Units by mouth 2 Times a Day.     • acetaminophen (TYLENOL) 325 MG Tab  Take 1,000 mg by mouth every four hours as needed.     • hydrocodone-acetaminophen (NORCO) 5-325 MG Tab per tablet Take 1-2 Tabs by mouth every four hours as needed (as needed for pain). 20 Tab 0   • ibuprofen (MOTRIN) 200 MG Tab Take 400-800 mg by mouth every 6 hours as needed.       No facility-administered encounter medications on file as of 9/21/2017.      Review of Systems   Constitutional: Negative for chills, fever, malaise/fatigue and weight loss.   HENT: Negative for ear discharge, ear pain, hearing loss and nosebleeds.    Eyes: Negative for blurred vision, double vision, pain and discharge.   Respiratory: Negative for cough and shortness of breath.    Cardiovascular: Negative for chest pain, palpitations, orthopnea, claudication, leg swelling and PND.   Gastrointestinal: Negative for abdominal pain, blood in stool, melena, nausea and vomiting.   Genitourinary: Negative for dysuria and hematuria.   Musculoskeletal: Negative for falls, joint pain and myalgias.   Skin: Negative for itching and rash.   Neurological: Negative for dizziness, sensory change, speech change, loss of consciousness and headaches.   Endo/Heme/Allergies: Negative for environmental allergies. Does not bruise/bleed easily.   Psychiatric/Behavioral: Negative for depression, hallucinations and suicidal ideas.        Objective:   /80   Pulse 68   Ht 1.829 m (6')   Wt 93.9 kg (207 lb)   SpO2 97%   BMI 28.07 kg/m²      Physical Exam   Constitutional: He is oriented to person, place, and time. He appears well-developed and well-nourished.   HENT:   Head: Normocephalic and atraumatic.   Eyes: EOM are normal.   Neck: Normal range of motion. No JVD present.   Cardiovascular: Normal rate, regular rhythm, normal heart sounds and intact distal pulses.  Exam reveals no gallop and no friction rub.    No murmur heard.  Bilateral femoral pulses are 2+, bilateral dorsalis pedis pulses are 2+, bilateral posterior tibialis pulses are 2+.      Pulmonary/Chest: No respiratory distress. He has no wheezes. He has no rales. He exhibits no tenderness.   Abdominal: Soft. Bowel sounds are normal. There is no tenderness. There is no rebound and no guarding.   The is no presence of abdominal bruits   Musculoskeletal: Normal range of motion.   Neurological: He is alert and oriented to person, place, and time.   Skin: Skin is warm and dry.   Psychiatric: He has a normal mood and affect.   Nursing note and vitals reviewed.      Assessment:     1. Other and unspecified hyperlipidemia  COMP METABOLIC PANEL    LIPID PANEL   2. Family history of cardiovascular disease  COMP METABOLIC PANEL    LIPID PANEL       Medical Decision Making:  Today's Assessment / Status / Plan:   At this time patient is clinically stable in terms of his cardiac standpoint.  Blood pressure is well controlled.    I will see patient back in clinic with lab tests and studies results in 12 months.    I thank you Dr. Siu for referring patient to our Cardiology Clinic today.        Yancy Siu M.D.  [address] [address]  VIA In Basket

## 2017-10-17 ENCOUNTER — IMMUNIZATION (OUTPATIENT)
Dept: OCCUPATIONAL MEDICINE | Facility: CLINIC | Age: 32
End: 2017-10-17

## 2017-10-17 DIAGNOSIS — Z23 NEED FOR VACCINATION: ICD-10-CM

## 2017-10-17 PROCEDURE — 90686 IIV4 VACC NO PRSV 0.5 ML IM: CPT | Performed by: PREVENTIVE MEDICINE

## 2018-09-19 ENCOUNTER — DOCUMENTATION (OUTPATIENT)
Dept: OCCUPATIONAL MEDICINE | Facility: CLINIC | Age: 33
End: 2018-09-19

## 2018-09-25 ENCOUNTER — EH NON-PROVIDER (OUTPATIENT)
Dept: OCCUPATIONAL MEDICINE | Facility: CLINIC | Age: 33
End: 2018-09-25

## 2018-09-25 DIAGNOSIS — Z02.89 ENCOUNTER FOR OCCUPATIONAL HEALTH EXAMINATION INVOLVING RESPIRATOR: Primary | ICD-10-CM

## 2018-09-25 PROCEDURE — 94375 RESPIRATORY FLOW VOLUME LOOP: CPT | Performed by: PREVENTIVE MEDICINE

## 2018-09-28 ENCOUNTER — HOSPITAL ENCOUNTER (OUTPATIENT)
Dept: LAB | Facility: MEDICAL CENTER | Age: 33
End: 2018-09-28
Payer: COMMERCIAL

## 2018-09-28 LAB
BDY FAT % MEASURED: 23.5 %
BP DIAS: 69 MMHG
BP SYS: 102 MMHG
DIABETES HTDIA: NO
EVENT NAME HTEVT: NORMAL
HYPERTENSION HTHYP: NO
SCREENING LOC CITY HTCIT: NORMAL
SCREENING LOC STATE HTSTA: NORMAL
SCREENING LOCATION HTLOC: NORMAL
SMOKING HTSMO: NO
SUBSCRIBER ID HTSID: NORMAL

## 2018-09-28 PROCEDURE — 80061 LIPID PANEL: CPT

## 2018-09-28 PROCEDURE — S5190 WELLNESS ASSESSMENT BY NONPH: HCPCS

## 2018-09-28 PROCEDURE — 82947 ASSAY GLUCOSE BLOOD QUANT: CPT

## 2018-09-28 PROCEDURE — 36415 COLL VENOUS BLD VENIPUNCTURE: CPT

## 2018-09-29 LAB
CHOLEST SERPL-MCNC: 188 MG/DL (ref 100–199)
FASTING HTFAS: NO
GLUCOSE SERPL-MCNC: 94 MG/DL (ref 65–99)
HDLC SERPL-MCNC: 39 MG/DL
LDLC SERPL CALC-MCNC: 125 MG/DL
TRIGL SERPL-MCNC: 122 MG/DL (ref 0–149)

## 2019-01-24 ENCOUNTER — OFFICE VISIT (OUTPATIENT)
Dept: CARDIOLOGY | Facility: MEDICAL CENTER | Age: 34
End: 2019-01-24
Payer: COMMERCIAL

## 2019-01-24 VITALS
WEIGHT: 201 LBS | DIASTOLIC BLOOD PRESSURE: 78 MMHG | SYSTOLIC BLOOD PRESSURE: 110 MMHG | BODY MASS INDEX: 27.22 KG/M2 | OXYGEN SATURATION: 99 % | HEIGHT: 72 IN | HEART RATE: 70 BPM

## 2019-01-24 DIAGNOSIS — G47.19 EXCESSIVE DAYTIME SLEEPINESS: ICD-10-CM

## 2019-01-24 DIAGNOSIS — Z82.49 FAMILY HISTORY OF CARDIOVASCULAR DISEASE: ICD-10-CM

## 2019-01-24 DIAGNOSIS — R06.09 DYSPNEA ON EXERTION: ICD-10-CM

## 2019-01-24 DIAGNOSIS — E78.2 MIXED HYPERLIPIDEMIA: ICD-10-CM

## 2019-01-24 PROCEDURE — 99214 OFFICE O/P EST MOD 30 MIN: CPT | Performed by: INTERNAL MEDICINE

## 2019-01-24 RX ORDER — ROSUVASTATIN CALCIUM 5 MG/1
5 TABLET, COATED ORAL EVERY EVENING
Qty: 30 TAB | Refills: 11 | Status: SHIPPED | OUTPATIENT
Start: 2019-01-24 | End: 2019-04-26 | Stop reason: SDUPTHER

## 2019-01-24 ASSESSMENT — ENCOUNTER SYMPTOMS
DOUBLE VISION: 0
COUGH: 0
BLURRED VISION: 0
HEADACHES: 0
FEVER: 0
SHORTNESS OF BREATH: 1
PALPITATIONS: 0
SENSORY CHANGE: 0
BRUISES/BLEEDS EASILY: 0
DEPRESSION: 0
ABDOMINAL PAIN: 0
FALLS: 0
DIAPHORESIS: 0
MYALGIAS: 0
MEMORY LOSS: 0
DIZZINESS: 0

## 2019-01-24 NOTE — LETTER
[facility]   [address], [place] [address]  [place] [address]  Phone: [phone]  Fax: [phone]              Ant Moya  1985    Encounter Date: 1/24/2019    Gurpreet Hutchison M.D.          PROGRESS NOTE:  Chief Complaint   Patient presents with   • Palpitations     F/V: 1 YR       Subjective:   Ant Moya is a 33 y.o. male who presents today for cardiac care and management due to strong family risk factor of heart disease along with genetic testing positive for familial hyperlipidemia.  His actual LDL is elevated based on age-adjusted risk analysis.  Patient is asymptomatic however.  No chest pain or shortness of breath.    Past Medical History:   Diagnosis Date   • Hyperlipidemia     diet controlled     Past Surgical History:   Procedure Laterality Date   • MASS EXCISION GENERAL  5/4/2017    Procedure: MASS EXCISION GENERAL SUBCUATNEOUS POSTERIOR NECK;  Surgeon: Cameron Garner M.D.;  Location: SURGERY [facility];  Service:      Family History   Problem Relation Age of Onset   • No Known Problems Mother    • Heart Disease Father 56        MI   • Arrythmia Father    • Heart Disease Paternal Uncle 56        MI   • Stroke Maternal Grandmother         cerebral aneurysm   • Stroke Paternal Grandfather    • Heart Disease Paternal Grandfather 44        MI     Social History     Social History   • Marital status:      Spouse name: N/A   • Number of children: N/A   • Years of education: N/A     Occupational History   • Not on file.     Social History Main Topics   • Smoking status: Former Smoker     Years: 1.00     Types: Cigars, Pipe   • Smokeless tobacco: Never Used   • Alcohol use Yes      Comment: 2 per month                  1-2 cigars month   • Drug use: No   • Sexual activity: Yes     Partners: Female     Other Topics Concern   • Not on file     Social History Narrative   • No narrative on file     No Known  Allergies  Outpatient Encounter Prescriptions as of 1/24/2019   Medication Sig Dispense Refill   • Omega-3 Fatty Acids (FISH OIL PO) Take  by mouth every day.     • rosuvastatin (CRESTOR) 5 MG Tab Take 1 Tab by mouth every evening. 30 Tab 11   • MULTIPLE VITAMIN PO Take 1 Tab by mouth every day.     • vitamin D (CHOLECALCIFEROL) 1000 UNIT Tab Take 1,000 Units by mouth 2 Times a Day.     • acetaminophen (TYLENOL) 325 MG Tab Take 1,000 mg by mouth every four hours as needed.     • hydrocodone-acetaminophen (NORCO) 5-325 MG Tab per tablet Take 1-2 Tabs by mouth every four hours as needed (as needed for pain). (Patient not taking: Reported on 1/24/2019) 20 Tab 0   • ibuprofen (MOTRIN) 200 MG Tab Take 400-800 mg by mouth every 6 hours as needed.       No facility-administered encounter medications on file as of 1/24/2019.      Review of Systems   Constitutional: Negative for diaphoresis and fever.   HENT: Negative for nosebleeds.    Eyes: Negative for blurred vision and double vision.   Respiratory: Positive for shortness of breath. Negative for cough.    Cardiovascular: Negative for chest pain and palpitations.   Gastrointestinal: Negative for abdominal pain.   Genitourinary: Negative for dysuria and frequency.   Musculoskeletal: Negative for falls and myalgias.   Skin: Negative for rash.   Neurological: Negative for dizziness, sensory change and headaches.   Endo/Heme/Allergies: Does not bruise/bleed easily.   Psychiatric/Behavioral: Negative for depression and memory loss.        Objective:   /78 (BP Location: Right arm, Patient Position: Sitting, BP Cuff Size: Adult)   Pulse 70   Ht 1.829 m (6')   Wt 91.2 kg (201 lb)   SpO2 99%   BMI 27.26 kg/m²      Physical Exam   Constitutional: He is oriented to person, place, and time. No distress.   HENT:   Head: Normocephalic and atraumatic.   Right Ear: External ear normal.   Left Ear: External ear normal.   Eyes: Right eye exhibits no discharge. Left eye exhibits  no discharge.   Neck: No JVD present. No thyromegaly present.   Cardiovascular: Normal rate, regular rhythm, normal heart sounds and intact distal pulses.  Exam reveals no gallop and no friction rub.    No murmur heard.  Pulmonary/Chest: Breath sounds normal. No respiratory distress.   Abdominal: Bowel sounds are normal. He exhibits no distension. There is no tenderness.   Musculoskeletal: He exhibits no edema or tenderness.   Neurological: He is alert and oriented to person, place, and time. No cranial nerve deficit.   Skin: Skin is warm and dry. He is not diaphoretic.   Psychiatric: He has a normal mood and affect. His behavior is normal.   Nursing note and vitals reviewed.      Assessment:     1. Mixed hyperlipidemia  rosuvastatin (CRESTOR) 5 MG Tab    COMP METABOLIC PANEL    LIPID PANEL   2. Family history of cardiovascular disease     3. Excessive daytime sleepiness  REFERRAL TO SLEEP STUDIES   4. Dyspnea on exertion  REFERRAL TO SLEEP STUDIES       Medical Decision Making:  Today's Assessment / Status / Plan:   Will start Rosuvastatin 5 mg po daily for primary prevention of cardiovascular disease.  Patient is at high risk based on family history and genetic testing.  I will refer patient to have sleep studies for obstructive sleep apnea.        Yancy Siu M.D.  [address] [address]  VIA In Basket

## 2019-01-24 NOTE — PROGRESS NOTES
Chief Complaint   Patient presents with   • Palpitations     F/V: 1 YR       Subjective:   Ant Moya is a 33 y.o. male who presents today for cardiac care and management due to strong family risk factor of heart disease along with genetic testing positive for familial hyperlipidemia.  His actual LDL is elevated based on age-adjusted risk analysis.  Patient is asymptomatic however.  No chest pain. He does feel very tired and fatigued duringday.     Past Medical History:   Diagnosis Date   • Hyperlipidemia     diet controlled     Past Surgical History:   Procedure Laterality Date   • MASS EXCISION GENERAL  5/4/2017    Procedure: MASS EXCISION GENERAL SUBCUATNEOUS POSTERIOR NECK;  Surgeon: Cameron Garner M.D.;  Location: SURGERY [facility];  Service:      Family History   Problem Relation Age of Onset   • No Known Problems Mother    • Heart Disease Father 56        MI   • Arrythmia Father    • Heart Disease Paternal Uncle 56        MI   • Stroke Maternal Grandmother         cerebral aneurysm   • Stroke Paternal Grandfather    • Heart Disease Paternal Grandfather 44        MI     Social History     Social History   • Marital status:      Spouse name: N/A   • Number of children: N/A   • Years of education: N/A     Occupational History   • Not on file.     Social History Main Topics   • Smoking status: Former Smoker     Years: 1.00     Types: Cigars, Pipe   • Smokeless tobacco: Never Used   • Alcohol use Yes      Comment: 2 per month                  1-2 cigars month   • Drug use: No   • Sexual activity: Yes     Partners: Female     Other Topics Concern   • Not on file     Social History Narrative   • No narrative on file     No Known Allergies  Outpatient Encounter Prescriptions as of 1/24/2019   Medication Sig Dispense Refill   • Omega-3 Fatty Acids (FISH OIL PO) Take  by mouth every day.     • rosuvastatin (CRESTOR) 5 MG Tab Take 1 Tab by mouth every evening. 30 Tab 11   • MULTIPLE  VITAMIN PO Take 1 Tab by mouth every day.     • vitamin D (CHOLECALCIFEROL) 1000 UNIT Tab Take 1,000 Units by mouth 2 Times a Day.     • acetaminophen (TYLENOL) 325 MG Tab Take 1,000 mg by mouth every four hours as needed.     • hydrocodone-acetaminophen (NORCO) 5-325 MG Tab per tablet Take 1-2 Tabs by mouth every four hours as needed (as needed for pain). (Patient not taking: Reported on 1/24/2019) 20 Tab 0   • ibuprofen (MOTRIN) 200 MG Tab Take 400-800 mg by mouth every 6 hours as needed.       No facility-administered encounter medications on file as of 1/24/2019.      Review of Systems   Constitutional: Positive for malaise/fatigue. Negative for diaphoresis and fever.   HENT: Negative for nosebleeds.    Eyes: Negative for blurred vision and double vision.   Respiratory: Positive for shortness of breath. Negative for cough.    Cardiovascular: Negative for chest pain and palpitations.   Gastrointestinal: Negative for abdominal pain.   Genitourinary: Negative for dysuria and frequency.   Musculoskeletal: Negative for falls and myalgias.   Skin: Negative for rash.   Neurological: Negative for dizziness, sensory change and headaches.   Endo/Heme/Allergies: Does not bruise/bleed easily.   Psychiatric/Behavioral: Negative for depression and memory loss.        Objective:   /78 (BP Location: Right arm, Patient Position: Sitting, BP Cuff Size: Adult)   Pulse 70   Ht 1.829 m (6')   Wt 91.2 kg (201 lb)   SpO2 99%   BMI 27.26 kg/m²     Physical Exam   Constitutional: He is oriented to person, place, and time. No distress.   HENT:   Head: Normocephalic and atraumatic.   Right Ear: External ear normal.   Left Ear: External ear normal.   Eyes: Right eye exhibits no discharge. Left eye exhibits no discharge.   Neck: No JVD present. No thyromegaly present.   Cardiovascular: Normal rate, regular rhythm, normal heart sounds and intact distal pulses.  Exam reveals no gallop and no friction rub.    No murmur  heard.  Pulmonary/Chest: Breath sounds normal. No respiratory distress.   Abdominal: Bowel sounds are normal. He exhibits no distension. There is no tenderness.   Musculoskeletal: He exhibits no edema or tenderness.   Neurological: He is alert and oriented to person, place, and time. No cranial nerve deficit.   Skin: Skin is warm and dry. He is not diaphoretic.   Psychiatric: He has a normal mood and affect. His behavior is normal.   Nursing note and vitals reviewed.      Assessment:     1. Mixed hyperlipidemia  rosuvastatin (CRESTOR) 5 MG Tab    COMP METABOLIC PANEL    LIPID PANEL   2. Family history of cardiovascular disease     3. Excessive daytime sleepiness  REFERRAL TO SLEEP STUDIES   4. Dyspnea on exertion  REFERRAL TO SLEEP STUDIES       Medical Decision Making:  Today's Assessment / Status / Plan:   Will start Rosuvastatin 5 mg po daily for primary prevention of cardiovascular disease.  Patient is at high risk based on family history and genetic testing.  I will refer patient to have sleep studies for obstructive sleep apnea.

## 2019-02-27 ENCOUNTER — OFFICE VISIT (OUTPATIENT)
Dept: MEDICAL GROUP | Facility: LAB | Age: 34
End: 2019-02-27
Payer: COMMERCIAL

## 2019-02-27 VITALS
HEIGHT: 72 IN | HEART RATE: 68 BPM | RESPIRATION RATE: 12 BRPM | TEMPERATURE: 97.3 F | BODY MASS INDEX: 26.82 KG/M2 | DIASTOLIC BLOOD PRESSURE: 62 MMHG | SYSTOLIC BLOOD PRESSURE: 104 MMHG | WEIGHT: 198 LBS | OXYGEN SATURATION: 96 %

## 2019-02-27 DIAGNOSIS — D17.0 LIPOMA OF NECK: ICD-10-CM

## 2019-02-27 DIAGNOSIS — Z00.00 HEALTH MAINTENANCE EXAMINATION: ICD-10-CM

## 2019-02-27 DIAGNOSIS — E78.49 FAMILIAL HYPERLIPIDEMIA: ICD-10-CM

## 2019-02-27 DIAGNOSIS — L05.91 PILONIDAL CYST: ICD-10-CM

## 2019-02-27 PROBLEM — D22.60 MELANOCYTIC NEVUS OF UPPER EXTREMITY: Status: RESOLVED | Noted: 2017-02-17 | Resolved: 2019-02-27

## 2019-02-27 PROCEDURE — 99214 OFFICE O/P EST MOD 30 MIN: CPT | Mod: 25 | Performed by: FAMILY MEDICINE

## 2019-02-27 PROCEDURE — 99395 PREV VISIT EST AGE 18-39: CPT | Performed by: FAMILY MEDICINE

## 2019-02-27 ASSESSMENT — PATIENT HEALTH QUESTIONNAIRE - PHQ9: CLINICAL INTERPRETATION OF PHQ2 SCORE: 0

## 2019-02-28 NOTE — PROGRESS NOTES
Subjective:   Ant Moya is a 33 y.o. male here today for anal lesion, annual     1. Health maintenance examination  Patient is here today for annual.  Last checkup over one year ago.  He has been seeing cardiology for diagnosis of familial hyperlipidemia.  He was started on rosuvastatin.  He is trying to eat a healthy diet and exercise.  Vaccinations are up-to-date.    2. Pilonidal cyst  This is new to discuss.  Patient reports a 1-1/2-year history of a nonhealing lesion above the anal verge.  He states that this initially happened while lifting heavy things.  This started out small but then opened up.  It did bleed some.  It looked black on the inside.  He does eventually nearly closed but generally does not completely closed.  No pus that he notices.  No significant pain.  No fevers or chills.  No anal leakage.  He does have some constipation and a hemorrhoid.    3. Familial hyperlipidemia  New to discuss with me.  Patient did genetic testing and was found to have the marker for familial hyperlipidemia.  He does have elevated cholesterol.  He was recently started on statin by his cardiologist.  He is tolerating this well without side effects.      Current medicines (including changes today)  Current Outpatient Prescriptions   Medication Sig Dispense Refill   • Omega-3 Fatty Acids (FISH OIL PO) Take  by mouth every day.     • rosuvastatin (CRESTOR) 5 MG Tab Take 1 Tab by mouth every evening. 30 Tab 11   • MULTIPLE VITAMIN PO Take 1 Tab by mouth every day.     • vitamin D (CHOLECALCIFEROL) 1000 UNIT Tab Take 1,000 Units by mouth 2 Times a Day.       No current facility-administered medications for this visit.      He  has a past medical history of Hyperlipidemia.    ROS   No fevers  No bowel changes  No LE edema       Objective:     Blood pressure 104/62, pulse 68, temperature 36.3 °C (97.3 °F), temperature source Temporal, resp. rate 12, height 1.829 m (6'), weight 89.8 kg (198 lb), SpO2 96 %. Body  mass index is 26.85 kg/m².   Physical Exam:  Constitutional: Alert, no distress.  Skin: Warm, dry, good turgor, there is a 2 cm soft mobile likely lipoma on the posterior neck  Eye: Equal, round and reactive, conjunctiva clear, lids normal.  ENMT: Lips without lesions, good dentition, oropharynx clear.  Neck: Trachea midline, no masses, no thyromegaly. No cervical or supraclavicular lymphadenopathy  Respiratory: Unlabored respiratory effort, lungs clear to auscultation, no wheezes, no ronchi.  Cardiovascular: Normal S1, S2, RRR, no murmur, no edema.  Abdomen: Soft, non-tender, no masses, no hepatosplenomegaly.  Psych: Alert and oriented x3, normal affect and mood.  /Rectal: There is a healed 3 cm white scar at the anal verge extending cephalically.  No tract identified.  There is a small hemorrhoid.  Normal rectal exam otherwise.      Assessment and Plan:   The following treatment plan was discussed    1. Health maintenance examination  Age-appropriate counseling given, vaccinations up-to-date, labs reviewed, medications reviewed    2. Pilonidal cyst  This is a new problem.  Suspect pilonidal cyst although not currently present on exam.  At this point he is not having significant flares, no abscesses.  We did gas expectant management versus surgical evaluation.  I am going to refer to general surgery for further evaluation.  Instructed to keep the area dry and clean  - REFERRAL TO GENERAL SURGERY    3. Familial hyperlipidemia  New to discuss with me, now stable on Crestor and following with cardiology      Followup: Return in about 1 year (around 2/27/2020) for Annual.       This note was created using voice recognition software. I have made every reasonable attempt to correct errors, however, I do anticipate some grammatical errors.

## 2019-04-22 ENCOUNTER — TELEPHONE (OUTPATIENT)
Dept: CARDIOLOGY | Facility: MEDICAL CENTER | Age: 34
End: 2019-04-22

## 2019-04-22 NOTE — TELEPHONE ENCOUNTER
S/w patient's wife about fasting labs, she stated that he will be completing them on Wednesday.

## 2019-04-24 ENCOUNTER — HOSPITAL ENCOUNTER (OUTPATIENT)
Dept: LAB | Facility: MEDICAL CENTER | Age: 34
End: 2019-04-24
Attending: INTERNAL MEDICINE
Payer: COMMERCIAL

## 2019-04-24 DIAGNOSIS — E78.2 MIXED HYPERLIPIDEMIA: ICD-10-CM

## 2019-04-24 LAB
ALBUMIN SERPL BCP-MCNC: 4.6 G/DL (ref 3.2–4.9)
ALBUMIN/GLOB SERPL: 1.5 G/DL
ALP SERPL-CCNC: 50 U/L (ref 30–99)
ALT SERPL-CCNC: 39 U/L (ref 2–50)
ANION GAP SERPL CALC-SCNC: 7 MMOL/L (ref 0–11.9)
AST SERPL-CCNC: 25 U/L (ref 12–45)
BILIRUB SERPL-MCNC: 1 MG/DL (ref 0.1–1.5)
BUN SERPL-MCNC: 14 MG/DL (ref 8–22)
CALCIUM SERPL-MCNC: 9.9 MG/DL (ref 8.5–10.5)
CHLORIDE SERPL-SCNC: 104 MMOL/L (ref 96–112)
CHOLEST SERPL-MCNC: 139 MG/DL (ref 100–199)
CO2 SERPL-SCNC: 26 MMOL/L (ref 20–33)
CREAT SERPL-MCNC: 0.91 MG/DL (ref 0.5–1.4)
GLOBULIN SER CALC-MCNC: 3 G/DL (ref 1.9–3.5)
GLUCOSE SERPL-MCNC: 89 MG/DL (ref 65–99)
HDLC SERPL-MCNC: 48 MG/DL
LDLC SERPL CALC-MCNC: 72 MG/DL
POTASSIUM SERPL-SCNC: 4.1 MMOL/L (ref 3.6–5.5)
PROT SERPL-MCNC: 7.6 G/DL (ref 6–8.2)
SODIUM SERPL-SCNC: 137 MMOL/L (ref 135–145)
TRIGL SERPL-MCNC: 97 MG/DL (ref 0–149)

## 2019-04-24 PROCEDURE — 80053 COMPREHEN METABOLIC PANEL: CPT

## 2019-04-24 PROCEDURE — 36415 COLL VENOUS BLD VENIPUNCTURE: CPT

## 2019-04-24 PROCEDURE — 80061 LIPID PANEL: CPT

## 2019-04-26 ENCOUNTER — OFFICE VISIT (OUTPATIENT)
Dept: CARDIOLOGY | Facility: MEDICAL CENTER | Age: 34
End: 2019-04-26
Payer: COMMERCIAL

## 2019-04-26 VITALS
SYSTOLIC BLOOD PRESSURE: 126 MMHG | HEIGHT: 72 IN | DIASTOLIC BLOOD PRESSURE: 70 MMHG | WEIGHT: 201 LBS | HEART RATE: 70 BPM | BODY MASS INDEX: 27.22 KG/M2

## 2019-04-26 DIAGNOSIS — E78.2 MIXED HYPERLIPIDEMIA: ICD-10-CM

## 2019-04-26 DIAGNOSIS — Z82.49 FAMILY HISTORY OF CARDIOVASCULAR DISEASE: ICD-10-CM

## 2019-04-26 PROCEDURE — 99214 OFFICE O/P EST MOD 30 MIN: CPT | Performed by: INTERNAL MEDICINE

## 2019-04-26 RX ORDER — ROSUVASTATIN CALCIUM 5 MG/1
5 TABLET, COATED ORAL EVERY EVENING
Qty: 90 TAB | Refills: 3 | Status: SHIPPED | OUTPATIENT
Start: 2019-04-26 | End: 2020-05-06 | Stop reason: SDUPTHER

## 2019-04-26 ASSESSMENT — ENCOUNTER SYMPTOMS
SPEECH CHANGE: 0
LOSS OF CONSCIOUSNESS: 0
NAUSEA: 0
EYE DISCHARGE: 0
CLAUDICATION: 0
SHORTNESS OF BREATH: 0
BLURRED VISION: 0
DIZZINESS: 0
PND: 0
COUGH: 0
BRUISES/BLEEDS EASILY: 0
BLOOD IN STOOL: 0
FALLS: 0
CHILLS: 0
HALLUCINATIONS: 0
HEADACHES: 0
ORTHOPNEA: 0
VOMITING: 0
MYALGIAS: 0
FEVER: 0
ABDOMINAL PAIN: 0
EYE PAIN: 0
DEPRESSION: 0
PALPITATIONS: 0
SENSORY CHANGE: 0
WEIGHT LOSS: 0
DOUBLE VISION: 0

## 2019-04-26 NOTE — PROGRESS NOTES
Chief Complaint   Patient presents with   • Hyperlipidemia       Subjective:   Ant Moya is a 33 y.o. male who presents today for cardiac care and management due to strong family risk factor of heart disease along with genetic testing positive for familial hyperlipidemia.  His actual LDL is elevated based on age-adjusted risk analysis.  Patient is asymptomatic however.  No chest pain. He does feel very tired and fatigued duringday.     I have independently interpreted and reviewed blood tests results with patient in clinic which shows normal LDL level 72, triglycerides, renal and liver function.      Past Medical History:   Diagnosis Date   • Hyperlipidemia     diet controlled     Past Surgical History:   Procedure Laterality Date   • MASS EXCISION GENERAL  5/4/2017    Procedure: MASS EXCISION GENERAL SUBCUATNEOUS POSTERIOR NECK;  Surgeon: Cameron Garner M.D.;  Location: SURGERY [facility];  Service:      Family History   Problem Relation Age of Onset   • No Known Problems Mother    • Heart Disease Father 56        MI   • Arrythmia Father    • Heart Disease Paternal Uncle 56        MI   • Stroke Maternal Grandmother         cerebral aneurysm   • Stroke Paternal Grandfather    • Heart Disease Paternal Grandfather 44        MI     Social History     Social History   • Marital status:      Spouse name: N/A   • Number of children: N/A   • Years of education: N/A     Occupational History   • Not on file.     Social History Main Topics   • Smoking status: Former Smoker     Years: 1.00     Types: Cigars, Pipe   • Smokeless tobacco: Never Used   • Alcohol use Yes      Comment: 2 per month                  1-2 cigars month   • Drug use: No   • Sexual activity: Yes     Partners: Female     Other Topics Concern   • Not on file     Social History Narrative   • No narrative on file     No Known Allergies  Outpatient Encounter Prescriptions as of 4/26/2019   Medication Sig Dispense Refill   •  rosuvastatin (CRESTOR) 5 MG Tab Take 1 Tab by mouth every evening. 90 Tab 3   • Omega-3 Fatty Acids (FISH OIL PO) Take  by mouth every day.     • MULTIPLE VITAMIN PO Take 1 Tab by mouth every day.     • vitamin D (CHOLECALCIFEROL) 1000 UNIT Tab Take 1,000 Units by mouth 2 Times a Day.     • [DISCONTINUED] rosuvastatin (CRESTOR) 5 MG Tab Take 1 Tab by mouth every evening. 30 Tab 11     No facility-administered encounter medications on file as of 4/26/2019.      Review of Systems   Constitutional: Negative for chills, fever, malaise/fatigue and weight loss.   HENT: Negative for ear discharge, ear pain, hearing loss and nosebleeds.    Eyes: Negative for blurred vision, double vision, pain and discharge.   Respiratory: Negative for cough and shortness of breath.    Cardiovascular: Negative for chest pain, palpitations, orthopnea, claudication, leg swelling and PND.   Gastrointestinal: Negative for abdominal pain, blood in stool, melena, nausea and vomiting.   Genitourinary: Negative for dysuria and hematuria.   Musculoskeletal: Negative for falls, joint pain and myalgias.   Skin: Negative for itching and rash.   Neurological: Negative for dizziness, sensory change, speech change, loss of consciousness and headaches.   Endo/Heme/Allergies: Negative for environmental allergies. Does not bruise/bleed easily.   Psychiatric/Behavioral: Negative for depression, hallucinations and suicidal ideas.        Objective:   /70 (BP Location: Left arm, Patient Position: Sitting, BP Cuff Size: Adult)   Pulse 70   Ht 1.829 m (6')   Wt 91.2 kg (201 lb)   BMI 27.26 kg/m²     Physical Exam   Constitutional: He is oriented to person, place, and time. No distress.   HENT:   Head: Normocephalic and atraumatic.   Right Ear: External ear normal.   Left Ear: External ear normal.   Eyes: Right eye exhibits no discharge. Left eye exhibits no discharge.   Neck: No JVD present. No thyromegaly present.   Cardiovascular: Normal rate, regular  rhythm, normal heart sounds and intact distal pulses.  Exam reveals no gallop and no friction rub.    No murmur heard.  Pulmonary/Chest: Breath sounds normal. No respiratory distress.   Abdominal: Bowel sounds are normal. He exhibits no distension. There is no tenderness.   Musculoskeletal: He exhibits no edema or tenderness.   Neurological: He is alert and oriented to person, place, and time. No cranial nerve deficit.   Skin: Skin is warm and dry. He is not diaphoretic.   Psychiatric: He has a normal mood and affect. His behavior is normal.   Nursing note and vitals reviewed.      Assessment:     1. Mixed hyperlipidemia  rosuvastatin (CRESTOR) 5 MG Tab    LIPID PANEL   2. Family history of cardiovascular disease  LIPID PANEL       Medical Decision Making:  Today's Assessment / Status / Plan:   At this time patient is clinically stable in terms of his cardiac standpoint.  Cont current medications at current dose.   Rosuvastatin 5 mg po daily.

## 2019-04-26 NOTE — LETTER
[facility]   [address], [place] [address]  [place] [address]  Phone: [phone]  Fax: [phone]              Ant Moya  1985    Encounter Date: 4/26/2019    Gurpreet Hutchison M.D.          PROGRESS NOTE:  Chief Complaint   Patient presents with   • Hyperlipidemia       Subjective:   Ant Moya is a 33 y.o. male who presents today for cardiac care and management due to strong family risk factor of heart disease along with genetic testing positive for familial hyperlipidemia.  His actual LDL is elevated based on age-adjusted risk analysis.  Patient is asymptomatic however.  No chest pain. He does feel very tired and fatigued duringday.     I have independently interpreted and reviewed blood tests results with patient in clinic which shows normal LDL level 72, triglycerides, renal and liver function.      Past Medical History:   Diagnosis Date   • Hyperlipidemia     diet controlled     Past Surgical History:   Procedure Laterality Date   • MASS EXCISION GENERAL  5/4/2017    Procedure: MASS EXCISION GENERAL SUBCUATNEOUS POSTERIOR NECK;  Surgeon: Cameron Garner M.D.;  Location: SURGERY [facility];  Service:      Family History   Problem Relation Age of Onset   • No Known Problems Mother    • Heart Disease Father 56        MI   • Arrythmia Father    • Heart Disease Paternal Uncle 56        MI   • Stroke Maternal Grandmother         cerebral aneurysm   • Stroke Paternal Grandfather    • Heart Disease Paternal Grandfather 44        MI     Social History     Social History   • Marital status:      Spouse name: N/A   • Number of children: N/A   • Years of education: N/A     Occupational History   • Not on file.     Social History Main Topics   • Smoking status: Former Smoker     Years: 1.00     Types: Cigars, Pipe   • Smokeless tobacco: Never Used   • Alcohol use Yes      Comment: 2 per month                  1-2 cigars month      • Drug use: No   • Sexual activity: Yes     Partners: Female     Other Topics Concern   • Not on file     Social History Narrative   • No narrative on file     No Known Allergies  Outpatient Encounter Prescriptions as of 4/26/2019   Medication Sig Dispense Refill   • rosuvastatin (CRESTOR) 5 MG Tab Take 1 Tab by mouth every evening. 90 Tab 3   • Omega-3 Fatty Acids (FISH OIL PO) Take  by mouth every day.     • MULTIPLE VITAMIN PO Take 1 Tab by mouth every day.     • vitamin D (CHOLECALCIFEROL) 1000 UNIT Tab Take 1,000 Units by mouth 2 Times a Day.     • [DISCONTINUED] rosuvastatin (CRESTOR) 5 MG Tab Take 1 Tab by mouth every evening. 30 Tab 11     No facility-administered encounter medications on file as of 4/26/2019.      Review of Systems   Constitutional: Negative for chills, fever, malaise/fatigue and weight loss.   HENT: Negative for ear discharge, ear pain, hearing loss and nosebleeds.    Eyes: Negative for blurred vision, double vision, pain and discharge.   Respiratory: Negative for cough and shortness of breath.    Cardiovascular: Negative for chest pain, palpitations, orthopnea, claudication, leg swelling and PND.   Gastrointestinal: Negative for abdominal pain, blood in stool, melena, nausea and vomiting.   Genitourinary: Negative for dysuria and hematuria.   Musculoskeletal: Negative for falls, joint pain and myalgias.   Skin: Negative for itching and rash.   Neurological: Negative for dizziness, sensory change, speech change, loss of consciousness and headaches.   Endo/Heme/Allergies: Negative for environmental allergies. Does not bruise/bleed easily.   Psychiatric/Behavioral: Negative for depression, hallucinations and suicidal ideas.        Objective:   /70 (BP Location: Left arm, Patient Position: Sitting, BP Cuff Size: Adult)   Pulse 70   Ht 1.829 m (6')   Wt 91.2 kg (201 lb)   BMI 27.26 kg/m²      Physical Exam   Constitutional: He is oriented to person, place, and time. No distress.    HENT:   Head: Normocephalic and atraumatic.   Right Ear: External ear normal.   Left Ear: External ear normal.   Eyes: Right eye exhibits no discharge. Left eye exhibits no discharge.   Neck: No JVD present. No thyromegaly present.   Cardiovascular: Normal rate, regular rhythm, normal heart sounds and intact distal pulses.  Exam reveals no gallop and no friction rub.    No murmur heard.  Pulmonary/Chest: Breath sounds normal. No respiratory distress.   Abdominal: Bowel sounds are normal. He exhibits no distension. There is no tenderness.   Musculoskeletal: He exhibits no edema or tenderness.   Neurological: He is alert and oriented to person, place, and time. No cranial nerve deficit.   Skin: Skin is warm and dry. He is not diaphoretic.   Psychiatric: He has a normal mood and affect. His behavior is normal.   Nursing note and vitals reviewed.      Assessment:     1. Mixed hyperlipidemia  rosuvastatin (CRESTOR) 5 MG Tab    LIPID PANEL   2. Family history of cardiovascular disease  LIPID PANEL       Medical Decision Making:  Today's Assessment / Status / Plan:   At this time patient is clinically stable in terms of his cardiac standpoint.  Cont current medications at current dose.   Rosuvastatin 5 mg po daily.      Yancy Siu M.D.  [id number] [id number]  VIA In Basket

## 2019-07-02 PROBLEM — G47.33 OSA (OBSTRUCTIVE SLEEP APNEA): Status: ACTIVE | Noted: 2019-07-02

## 2019-07-02 PROBLEM — Z87.891 FORMER SMOKER: Status: ACTIVE | Noted: 2019-07-02

## 2019-07-02 PROBLEM — E66.3 OVERWEIGHT (BMI 25.0-29.9): Status: ACTIVE | Noted: 2019-07-02
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
## 2019-07-03 ENCOUNTER — SLEEP CENTER VISIT (OUTPATIENT)
Dept: SLEEP MEDICINE | Facility: MEDICAL CENTER | Age: 34
End: 2019-07-03
Payer: COMMERCIAL

## 2019-07-03 VITALS
DIASTOLIC BLOOD PRESSURE: 70 MMHG | OXYGEN SATURATION: 99 % | BODY MASS INDEX: 27.09 KG/M2 | SYSTOLIC BLOOD PRESSURE: 102 MMHG | HEIGHT: 72 IN | HEART RATE: 70 BPM | RESPIRATION RATE: 16 BRPM | WEIGHT: 200 LBS

## 2019-07-03 DIAGNOSIS — E66.3 OVERWEIGHT (BMI 25.0-29.9): ICD-10-CM

## 2019-07-03 DIAGNOSIS — Z78.9 NON-SMOKER: ICD-10-CM

## 2019-07-03 DIAGNOSIS — E78.49 FAMILIAL HYPERLIPIDEMIA: ICD-10-CM

## 2019-07-03 DIAGNOSIS — G47.33 OSA (OBSTRUCTIVE SLEEP APNEA): ICD-10-CM

## 2019-07-03 PROBLEM — Z87.891 FORMER SMOKER: Status: RESOLVED | Noted: 2019-07-02 | Resolved: 2019-07-03

## 2019-07-03 PROCEDURE — 99244 OFF/OP CNSLTJ NEW/EST MOD 40: CPT | Performed by: INTERNAL MEDICINE

## 2019-07-03 RX ORDER — ZOLPIDEM TARTRATE 5 MG/1
5 TABLET ORAL NIGHTLY PRN
Qty: 3 TAB | Refills: 0 | Status: SHIPPED | OUTPATIENT
Start: 2019-07-03 | End: 2019-08-03

## 2019-07-19 ENCOUNTER — SLEEP STUDY (OUTPATIENT)
Dept: SLEEP MEDICINE | Facility: MEDICAL CENTER | Age: 34
End: 2019-07-19
Attending: INTERNAL MEDICINE
Payer: COMMERCIAL

## 2019-07-19 DIAGNOSIS — G47.33 OSA (OBSTRUCTIVE SLEEP APNEA): ICD-10-CM

## 2019-07-19 PROCEDURE — 95810 POLYSOM 6/> YRS 4/> PARAM: CPT | Performed by: FAMILY MEDICINE

## 2019-07-22 NOTE — PROCEDURES
Technical summary:The patient underwent a diagnostic polysomnogram. This was a 16 channel montage study to include a 6 channel EEG, a 2 channel EOG, and chin EMG, left and right leg EMG, a snore channel, a nasal pressure transducer, and a nasal oral airflow thermistor.   Respiratory effort was assessed with the use of a thoracic and abdominal monitor and overnight oximetry was obtained. Audio and video recordings were reviewed. This was a fully attended study and sleep stage scoring was performed. The test was technically adequate.       Scoring Criteria: A modification of the the AASM Manual for the Scoring of Sleep and Associated Events, 2012, was used.   Obstructive apnea was scored by cessation of airflow for at least 10 seconds with continuing respiratory effort.  Central apnea was scored by cessation of airflow for at least 10 seconds with no effort.  Hypopnea was scored by a 30% or more reduction in airflow for at least 10 seconds accompanied by an arterial oxygen desaturation of 3% or more.  (For Medicare patients, hypopneas were scored by a 30% or more reduction in airflow for at least 10 seconds accompanied by an arterial oxygen saturation of 4% or more, as required by their insurance, CMS.  Interpretation:  Study start time was 08:03:14 PM.  Diagnostic recording time was 9h 37.0m with a total sleep time of 6h 49.0m resulting in a sleep efficiency of 70.88%%.  Sleep latency from the start of the study was 36 minutes and the latency from sleep to REM was 143 minutes.In total,56  arousals were scored for an arousal index of 8.2. Sleep stages showed decreased REM, normal N3, decreased SE and increased WASO of 131 min.     Respiratory:  There were a total of 0 apneas consisting of 0 obstructive apneas, 0 mixed apneas, and 0 central apneas.  A total of 40 hypopneas were scored.The apnea index was 0.00 per hour and the hypopnea index was 5.87 per hour resulting in an overall AHI of 5.87/hr.AHI during rem  was 4.9 and AHI while supine was 9.77.    Oximetry:  There was a mean oxygen saturation of 93.0% with a minimum oxygen saturation of 87.0%.  Time spent with oxygen saturations below 89% was 5.1 minutes.    Cardiac:  The highest heart rate seen while awake was 92 BPM while the highest heart rate during sleep was 84 BPM with an average sleeping heart rate of 62 BPM.    Limb Movements:  There were a total of 20 PLMs during sleep, of which 7 were PLMS arousals.  This resulted in a PLMS index of 2.9 and a PLMS arousal index of 1.0.      Impression:  1.  Mild OAS with AHI of 5.8/hr and O2 kristofer 92 %  2.  Normal oximetry   3.  Increased WASO      Recommendations:  Recommend the patient return for a CPAP titration. In some cases alternative treatment options may prove effective in resolving sleep apnea and these options include upper airway surgery, the use of a dental orthotic or weight loss and positional therapy. Clinical correlation is required. In general patients with sleep apnea are advised to avoid alcohol and sedatives and to not operate a motor vehicle while drowsy and are at a greater risk for cardiovascular disease.

## 2019-07-26 ENCOUNTER — SLEEP CENTER VISIT (OUTPATIENT)
Dept: SLEEP MEDICINE | Facility: MEDICAL CENTER | Age: 34
End: 2019-07-26
Payer: COMMERCIAL

## 2019-07-26 VITALS
DIASTOLIC BLOOD PRESSURE: 70 MMHG | WEIGHT: 200 LBS | RESPIRATION RATE: 16 BRPM | BODY MASS INDEX: 27.09 KG/M2 | OXYGEN SATURATION: 97 % | SYSTOLIC BLOOD PRESSURE: 120 MMHG | HEIGHT: 72 IN | HEART RATE: 68 BPM

## 2019-07-26 DIAGNOSIS — G47.33 OSA (OBSTRUCTIVE SLEEP APNEA): ICD-10-CM

## 2019-07-26 PROCEDURE — 99213 OFFICE O/P EST LOW 20 MIN: CPT | Performed by: NURSE PRACTITIONER

## 2019-07-26 ASSESSMENT — ENCOUNTER SYMPTOMS
NEUROLOGICAL NEGATIVE: 1
WEAKNESS: 0
FEVER: 0
WEIGHT LOSS: 0
BRUISES/BLEEDS EASILY: 0
EYE DISCHARGE: 0
DIAPHORESIS: 0
RESPIRATORY NEGATIVE: 1
EYE PAIN: 0
INSOMNIA: 1
CARDIOVASCULAR NEGATIVE: 1
CHILLS: 0
DEPRESSION: 0
HALLUCINATIONS: 0
MEMORY LOSS: 0
NERVOUS/ANXIOUS: 0
MUSCULOSKELETAL NEGATIVE: 1

## 2019-07-26 ASSESSMENT — LIFESTYLE VARIABLES: SUBSTANCE_ABUSE: 0

## 2019-07-26 NOTE — PROGRESS NOTES
Chief Complaint   Patient presents with   • Apnea     Last Seen 07/03/19   • Results     Sleep Study 07/19/19         HPI: This patient is a 34 y.o. male, who presents for sleep study results.  His wife and daughter accompany him today.    He was seen in consultation with Dr. Mccracken July 3, 2019.  Patient was referred by his cardiologist Dr. Hutchison.    Sleep symptoms include EDS.  His wife reports that he will fall asleep very easily during quiet moments of the day.  She reports loud snoring.  He reports occasional resuscitative gasps.  He will wake 2-3 times per night.  His brother's been diagnosed with sleep apnea.  Familial hyperlipidemia.    PSG indicates mild obstructive sleep apnea with an AHI of 5.8, minimum oxygen saturation of 92 %.  Sleep testing was reviewed with the patient and his wife.    Past Medical History:   Diagnosis Date   • Hyperlipidemia     diet controlled       Social History   Substance Use Topics   • Smoking status: Never Smoker   • Smokeless tobacco: Never Used   • Alcohol use Yes      Comment: 2 per month                  1-2 cigars month       Family History   Problem Relation Age of Onset   • No Known Problems Mother    • Heart Disease Father 56        MI   • Arrythmia Father    • Heart Disease Paternal Uncle 56        MI   • Stroke Maternal Grandmother         cerebral aneurysm   • Stroke Paternal Grandfather    • Heart Disease Paternal Grandfather 44        MI       Immunization History   Administered Date(s) Administered   • Influenza Seasonal Injectable 12/11/2013   • Influenza TIV (IM) 12/11/2013   • Influenza Vaccine Quad Inj (Pf) 11/12/2014, 11/09/2016, 10/17/2017, 10/02/2018   • Influenza Vaccine Quad Inj (Preserved) 10/29/2015   • Tdap Vaccine 08/25/2017   • Tuberculin Skin Test 12/11/2013, 12/18/2013, 07/08/2014       Current medications as of today   Current Outpatient Prescriptions   Medication Sig Dispense Refill   • rosuvastatin (CRESTOR) 5 MG Tab Take 1 Tab by mouth every  evening. 90 Tab 3   • Omega-3 Fatty Acids (FISH OIL PO) Take  by mouth every day.     • MULTIPLE VITAMIN PO Take 1 Tab by mouth every day.     • vitamin D (CHOLECALCIFEROL) 1000 UNIT Tab Take 1,000 Units by mouth 2 Times a Day.     • zolpidem (AMBIEN) 5 MG Tab Take 1 Tab by mouth at bedtime as needed for up to 3 doses. Take 1-3 tabs prn (Patient not taking: Reported on 7/26/2019) 3 Tab 0     No current facility-administered medications for this visit.        Allergies: Patient has no known allergies.    /70 (BP Location: Right arm, Patient Position: Sitting, BP Cuff Size: Adult)   Pulse 68   Resp 16   Ht 1.829 m (6')   Wt 90.7 kg (200 lb)   SpO2 97%       Review of Systems   Constitutional: Positive for malaise/fatigue. Negative for chills, diaphoresis, fever and weight loss.   HENT: Negative.    Eyes: Negative for pain and discharge.   Respiratory: Negative.    Cardiovascular: Negative.    Musculoskeletal: Negative.    Neurological: Negative.  Negative for weakness.   Endo/Heme/Allergies: Negative for environmental allergies. Does not bruise/bleed easily.   Psychiatric/Behavioral: Negative for depression, hallucinations, memory loss, substance abuse and suicidal ideas. The patient has insomnia. The patient is not nervous/anxious.        Physical Exam   Constitutional: He is oriented to person, place, and time and well-developed, well-nourished, and in no distress.   HENT:   Head: Normocephalic and atraumatic.   Eyes: Pupils are equal, round, and reactive to light.   Neck: Normal range of motion. Neck supple. No tracheal deviation present.   Pulmonary/Chest: Effort normal.   Neurological: He is alert and oriented to person, place, and time. Gait normal.   Skin: Skin is warm and dry.   Psychiatric: Mood, memory, affect and judgment normal.   Vitals reviewed.      Diagnoses/Plan:    1. MATT (obstructive sleep apnea)  I reviewed sleep study in detail with the patient and his wife.  He has evidence of mild  sleep apnea with no significant nocturnal desaturations.  He does have significant sleep symptoms.  We discussed trial of CPAP or dental appliance.  He is interested in trying dental appliance.  We will submit referral to Dr. Alonzo.  Patient will follow-up in approximately 4 months after obtaining dental appliance.  He will require home sleep study with dental appliance once final adjustments made.  He is aware of this.      This dictation was created using voice recognition software. The accuracy of the dictation is limited to the abilities of the software. I expect there may be some errors of grammar and possibly content.

## 2019-07-29 ENCOUNTER — TELEPHONE (OUTPATIENT)
Dept: PULMONOLOGY | Facility: HOSPICE | Age: 34
End: 2019-07-29

## 2019-07-29 NOTE — TELEPHONE ENCOUNTER
Order, Demo, Last Ov, Consult, Raw Data, Interp, Copy of ID and [facility] [phone]

## 2019-09-24 ENCOUNTER — IMMUNIZATION (OUTPATIENT)
Dept: OCCUPATIONAL MEDICINE | Facility: CLINIC | Age: 34
End: 2019-09-24

## 2019-09-24 DIAGNOSIS — Z23 NEED FOR VACCINATION: ICD-10-CM

## 2019-09-24 PROCEDURE — 90686 IIV4 VACC NO PRSV 0.5 ML IM: CPT | Performed by: NURSE PRACTITIONER

## 2019-09-25 ENCOUNTER — HOSPITAL ENCOUNTER (OUTPATIENT)
Dept: LAB | Facility: MEDICAL CENTER | Age: 34
End: 2019-09-25
Payer: COMMERCIAL

## 2019-09-25 LAB
BDY FAT % MEASURED: 23.8 %
BP DIAS: 74 MMHG
BP SYS: 115 MMHG
CHOLEST SERPL-MCNC: 152 MG/DL (ref 100–199)
DIABETES HTDIA: NO
EVENT NAME HTEVT: NORMAL
FASTING HTFAS: YES
FASTING STATUS PATIENT QL REPORTED: NORMAL
GLUCOSE SERPL-MCNC: 100 MG/DL (ref 65–99)
HDLC SERPL-MCNC: 52 MG/DL
HYPERTENSION HTHYP: NO
LDLC SERPL CALC-MCNC: 86 MG/DL
SCREENING LOC CITY HTCIT: NORMAL
SCREENING LOC STATE HTSTA: NORMAL
SCREENING LOCATION HTLOC: NORMAL
SMOKING HTSMO: NO
SUBSCRIBER ID HTSID: NORMAL
TRIGL SERPL-MCNC: 72 MG/DL (ref 0–149)

## 2019-09-25 PROCEDURE — 82947 ASSAY GLUCOSE BLOOD QUANT: CPT

## 2019-09-25 PROCEDURE — S5190 WELLNESS ASSESSMENT BY NONPH: HCPCS

## 2019-09-25 PROCEDURE — 36415 COLL VENOUS BLD VENIPUNCTURE: CPT

## 2019-09-25 PROCEDURE — 80061 LIPID PANEL: CPT

## 2019-10-03 ENCOUNTER — DOCUMENTATION (OUTPATIENT)
Dept: OCCUPATIONAL MEDICINE | Facility: CLINIC | Age: 34
End: 2019-10-03

## 2019-10-08 ENCOUNTER — EH NON-PROVIDER (OUTPATIENT)
Dept: OCCUPATIONAL MEDICINE | Facility: CLINIC | Age: 34
End: 2019-10-08

## 2019-10-08 DIAGNOSIS — Z02.89 ENCOUNTER FOR OCCUPATIONAL HEALTH EXAMINATION INVOLVING RESPIRATOR: ICD-10-CM

## 2019-10-08 PROCEDURE — 94375 RESPIRATORY FLOW VOLUME LOOP: CPT | Performed by: NURSE PRACTITIONER

## 2020-02-28 ENCOUNTER — APPOINTMENT (OUTPATIENT)
Dept: SLEEP MEDICINE | Facility: MEDICAL CENTER | Age: 35
End: 2020-02-28

## 2020-03-27 ENCOUNTER — HOSPITAL ENCOUNTER (OUTPATIENT)
Dept: LAB | Facility: MEDICAL CENTER | Age: 35
End: 2020-03-27
Attending: INTERNAL MEDICINE
Payer: COMMERCIAL

## 2020-03-27 LAB
CHOLEST SERPL-MCNC: 146 MG/DL (ref 100–199)
FASTING STATUS PATIENT QL REPORTED: NORMAL
HDLC SERPL-MCNC: 48 MG/DL
LDLC SERPL CALC-MCNC: 78 MG/DL
TRIGL SERPL-MCNC: 98 MG/DL (ref 0–149)

## 2020-03-27 PROCEDURE — 80061 LIPID PANEL: CPT

## 2020-03-27 PROCEDURE — 36415 COLL VENOUS BLD VENIPUNCTURE: CPT

## 2020-05-06 DIAGNOSIS — E78.2 MIXED HYPERLIPIDEMIA: ICD-10-CM

## 2020-05-07 RX ORDER — ROSUVASTATIN CALCIUM 5 MG/1
5 TABLET, COATED ORAL EVERY EVENING
Qty: 90 TAB | Refills: 1 | Status: SHIPPED | OUTPATIENT
Start: 2020-05-07 | End: 2020-08-04 | Stop reason: SDUPTHER

## 2020-08-04 ENCOUNTER — OFFICE VISIT (OUTPATIENT)
Dept: CARDIOLOGY | Facility: MEDICAL CENTER | Age: 35
End: 2020-08-04
Payer: COMMERCIAL

## 2020-08-04 VITALS
HEIGHT: 72 IN | BODY MASS INDEX: 27.22 KG/M2 | WEIGHT: 201 LBS | SYSTOLIC BLOOD PRESSURE: 104 MMHG | OXYGEN SATURATION: 95 % | DIASTOLIC BLOOD PRESSURE: 70 MMHG | HEART RATE: 69 BPM

## 2020-08-04 DIAGNOSIS — R06.09 DYSPNEA ON EXERTION: ICD-10-CM

## 2020-08-04 DIAGNOSIS — Z82.49 FAMILY HISTORY OF CARDIOVASCULAR DISEASE: ICD-10-CM

## 2020-08-04 DIAGNOSIS — E78.2 MIXED HYPERLIPIDEMIA: ICD-10-CM

## 2020-08-04 DIAGNOSIS — G47.19 EXCESSIVE DAYTIME SLEEPINESS: ICD-10-CM

## 2020-08-04 PROCEDURE — 99214 OFFICE O/P EST MOD 30 MIN: CPT | Performed by: INTERNAL MEDICINE

## 2020-08-04 RX ORDER — ROSUVASTATIN CALCIUM 5 MG/1
5 TABLET, COATED ORAL EVERY EVENING
Qty: 90 TAB | Refills: 4 | Status: SHIPPED | OUTPATIENT
Start: 2020-08-04

## 2020-08-04 ASSESSMENT — ENCOUNTER SYMPTOMS
HEADACHES: 0
HALLUCINATIONS: 0
SHORTNESS OF BREATH: 0
ORTHOPNEA: 0
LOSS OF CONSCIOUSNESS: 0
PND: 0
FEVER: 0
BLURRED VISION: 0
COUGH: 0
DEPRESSION: 0
MYALGIAS: 0
EYE PAIN: 0
SENSORY CHANGE: 0
BRUISES/BLEEDS EASILY: 0
ABDOMINAL PAIN: 0
SPEECH CHANGE: 0
NAUSEA: 0
CLAUDICATION: 0
CHILLS: 0
DIZZINESS: 0
EYE DISCHARGE: 0
DOUBLE VISION: 0
PALPITATIONS: 0
VOMITING: 0
WEIGHT LOSS: 0
BLOOD IN STOOL: 0
FALLS: 0

## 2020-08-04 NOTE — PROGRESS NOTES
Chief Complaint   Patient presents with   • Hyperlipidemia       Subjective:   Ant Moya is a 35 y.o. male who presents today for cardiac care and management due to strong family risk factor of heart disease along with genetic testing positive for familial hyperlipidemia.  His actual LDL is elevated based on age-adjusted risk analysis but much better now while on Rosuvastatin 5 mg daily.  Patient is asymptomatic however.  No chest pain.     In the interim, patient has been doing well without having any symptoms. Patient denies having chest pain, dyspnea, palpitation, presyncope, syncope episodes. Able to climb up at least 2 flights of stairs.    I have independently interpreted and reviewed blood tests results with patient in clinic which shows normal LDL level 78, triglycerides 98, renal and liver function.      Past Medical History:   Diagnosis Date   • Hyperlipidemia     diet controlled     Past Surgical History:   Procedure Laterality Date   • MASS EXCISION GENERAL  5/4/2017    Procedure: MASS EXCISION GENERAL SUBCUATNEOUS POSTERIOR NECK;  Surgeon: Cameron Garner M.D.;  Location: SURGERY [facility];  Service:      Family History   Problem Relation Age of Onset   • No Known Problems Mother    • Heart Disease Father 56        MI   • Arrythmia Father    • Heart Disease Paternal Uncle 56        MI   • Stroke Maternal Grandmother         cerebral aneurysm   • Stroke Paternal Grandfather    • Heart Disease Paternal Grandfather 44        MI     Social History     Socioeconomic History   • Marital status:      Spouse name: Not on file   • Number of children: Not on file   • Years of education: Not on file   • Highest education level: Not on file   Occupational History   • Not on file   Social Needs   • Financial resource strain: Not on file   • Food insecurity     Worry: Not on file     Inability: Not on file   • Transportation needs     Medical: Not on file     Non-medical: Not on file    Tobacco Use   • Smoking status: Never Smoker   • Smokeless tobacco: Never Used   Substance and Sexual Activity   • Alcohol use: Yes     Comment: 2 per month                  1-2 cigars month   • Drug use: No   • Sexual activity: Yes     Partners: Female   Lifestyle   • Physical activity     Days per week: Not on file     Minutes per session: Not on file   • Stress: Not on file   Relationships   • Social connections     Talks on phone: Not on file     Gets together: Not on file     Attends [employer] service: Not on file     Active member of club or organization: Not on file     Attends meetings of clubs or organizations: Not on file     Relationship status: Not on file   • Intimate partner violence     Fear of current or ex partner: Not on file     Emotionally abused: Not on file     Physically abused: Not on file     Forced sexual activity: Not on file   Other Topics Concern   • Not on file   Social History Narrative   • Not on file     No Known Allergies  Outpatient Encounter Medications as of 8/4/2020   Medication Sig Dispense Refill   • rosuvastatin (CRESTOR) 5 MG Tab Take 1 Tab by mouth every evening. 90 Tab 1   • Omega-3 Fatty Acids (FISH OIL PO) Take  by mouth every day.     • MULTIPLE VITAMIN PO Take 1 Tab by mouth every day.     • vitamin D (CHOLECALCIFEROL) 1000 UNIT Tab Take 1,000 Units by mouth 2 Times a Day.       No facility-administered encounter medications on file as of 8/4/2020.      Review of Systems   Constitutional: Negative for chills, fever, malaise/fatigue and weight loss.   HENT: Negative for ear discharge, ear pain, hearing loss and nosebleeds.    Eyes: Negative for blurred vision, double vision, pain and discharge.   Respiratory: Negative for cough and shortness of breath.    Cardiovascular: Negative for chest pain, palpitations, orthopnea, claudication, leg swelling and PND.   Gastrointestinal: Negative for abdominal pain, blood in stool, melena, nausea and vomiting.   Genitourinary:  Negative for dysuria and hematuria.   Musculoskeletal: Negative for falls, joint pain and myalgias.   Skin: Negative for itching and rash.   Neurological: Negative for dizziness, sensory change, speech change, loss of consciousness and headaches.   Endo/Heme/Allergies: Negative for environmental allergies. Does not bruise/bleed easily.   Psychiatric/Behavioral: Negative for depression, hallucinations and suicidal ideas.        Objective:   There were no vitals taken for this visit.    Physical Exam   Constitutional: He is oriented to person, place, and time. No distress.   HENT:   Head: Normocephalic and atraumatic.   Right Ear: External ear normal.   Left Ear: External ear normal.   Eyes: Right eye exhibits no discharge. Left eye exhibits no discharge.   Neck: No JVD present. No thyromegaly present.   Cardiovascular: Normal rate, regular rhythm, normal heart sounds and intact distal pulses. Exam reveals no gallop and no friction rub.   No murmur heard.  Pulmonary/Chest: Breath sounds normal. No respiratory distress.   Abdominal: Bowel sounds are normal. He exhibits no distension. There is no abdominal tenderness.   Musculoskeletal:         General: No tenderness or edema.   Neurological: He is alert and oriented to person, place, and time. No cranial nerve deficit.   Skin: Skin is warm and dry. He is not diaphoretic.   Psychiatric: He has a normal mood and affect. His behavior is normal.   Nursing note and vitals reviewed.      Assessment:     1. Dyspnea on exertion     2. Mixed hyperlipidemia     3. Family history of cardiovascular disease     4. Excessive daytime sleepiness         Medical Decision Making:  Today's Assessment / Status / Plan:   At this time patient is clinically stable in terms of his cardiac standpoint.  Cont current medications at current dose.   Rosuvastatin 5 mg po daily.

## 2020-12-16 DIAGNOSIS — Z23 NEED FOR VACCINATION: ICD-10-CM

## (undated) DEVICE — GLOVE BIOGEL PI INDICATOR SZ 8.0 SURGICAL PF LF -(50/BX 4BX/CA)

## (undated) DEVICE — SUTURE 3-0 30 CM X 30 CM STRATAFIX SPRIAL PS-1 NEEDLE (12/BX)

## (undated) DEVICE — MASK ANESTHESIA ADULT  - (100/CA)

## (undated) DEVICE — DRAPE LAPAROTOMY T SHEET - (12EA/CA)

## (undated) DEVICE — SUTURE GENERAL

## (undated) DEVICE — SODIUM CHL IRRIGATION 0.9% 1000ML (12EA/CA)

## (undated) DEVICE — ELECTRODE DUAL RETURN W/ CORD - (50/PK)

## (undated) DEVICE — GLOVE BIOGEL SZ 7.5 SURGICAL PF LTX - (50PR/BX 4BX/CA)

## (undated) DEVICE — HEAD HOLDER JUNIOR/ADULT

## (undated) DEVICE — GOWN WARMING STANDARD FLEX - (30/CA)

## (undated) DEVICE — SET EXTENSION WITH 2 PORTS (48EA/CA) ***PART #2C8610 IS A SUBSTITUTE*****

## (undated) DEVICE — SENSOR SPO2 NEO LNCS ADHESIVE (20/BX) SEE USER NOTES

## (undated) DEVICE — GOWN SURGEONS X-LARGE - DISP. (30/CA)

## (undated) DEVICE — SLEEVE, VASO, THIGH, MED

## (undated) DEVICE — CANISTER SUCTION 3000ML MECHANICAL FILTER AUTO SHUTOFF MEDI-VAC NONSTERILE LF DISP  (40EA/CA)

## (undated) DEVICE — SUTURE 4-0 30CM STRATAFIX SPIRAL PS-2 (12EA/BX)

## (undated) DEVICE — PROTECTOR ULNA NERVE - (36PR/CA)

## (undated) DEVICE — ELECTRODE 850 FOAM ADHESIVE - HYDROGEL RADIOTRNSPRNT (50/PK)

## (undated) DEVICE — SET LEADWIRE 5 LEAD BEDSIDE DISPOSABLE ECG (1SET OF 5/EA)

## (undated) DEVICE — NEPTUNE 4 PORT MANIFOLD - (20/PK)

## (undated) DEVICE — CHLORAPREP 26 ML APPLICATOR - ORANGE TINT(25/CA)

## (undated) DEVICE — GLOVE SZ 7.5 BIOGEL PI MICRO - PF LF (50PR/BX)

## (undated) DEVICE — TUBING CLEARLINK DUO-VENT - C-FLO (48EA/CA)

## (undated) DEVICE — GLOVE BIOGEL SZ 8 SURGICAL PF LTX - (50PR/BX 4BX/CA)

## (undated) DEVICE — LACTATED RINGERS INJ 1000 ML - (14EA/CA 60CA/PF)

## (undated) DEVICE — SUTURE 3-0 VICRYL PLUS SH - 8X 18 INCH (12/BX)

## (undated) DEVICE — GOWN SURGEONS LARGE - (32/CA)

## (undated) DEVICE — BLADE SURGICAL #15 - (50/BX 3BX/CA)

## (undated) DEVICE — SUCTION INSTRUMENT YANKAUER BULBOUS TIP W/O VENT (50EA/CA)

## (undated) DEVICE — SYRINGE 10 ML CONTROL LL (25EA/BX 4BX/CA)

## (undated) DEVICE — STAPLER SKIN DISP - (6/BX 10BX/CA) VISISTAT

## (undated) DEVICE — NEEDLE NON SAFETY 25 GA X 1 1/2 IN HYPO (100EA/BX)

## (undated) DEVICE — LEAD SET 6 DISP. EKG NIHON KOHDEN

## (undated) DEVICE — SPONGE GAUZESTER 4 X 4 4PLY - (128PK/CA)

## (undated) DEVICE — KIT ANESTHESIA W/CIRCUIT & 3/LT BAG W/FILTER (20EA/CA)

## (undated) DEVICE — KIT ROOM DECONTAMINATION

## (undated) DEVICE — PACK MINOR BASIN - (2EA/CA)